# Patient Record
Sex: MALE
[De-identification: names, ages, dates, MRNs, and addresses within clinical notes are randomized per-mention and may not be internally consistent; named-entity substitution may affect disease eponyms.]

---

## 2017-03-15 ENCOUNTER — RESULT CHARGE (OUTPATIENT)
Age: 62
End: 2017-03-15

## 2017-03-15 ENCOUNTER — APPOINTMENT (OUTPATIENT)
Dept: ENDOCRINOLOGY | Facility: CLINIC | Age: 62
End: 2017-03-15

## 2017-03-15 DIAGNOSIS — F41.9 ANXIETY DISORDER, UNSPECIFIED: ICD-10-CM

## 2017-03-15 DIAGNOSIS — Z78.9 OTHER SPECIFIED HEALTH STATUS: ICD-10-CM

## 2017-03-15 LAB
ALBUMIN SERPL ELPH-MCNC: 4.2 G/DL
ALP BLD-CCNC: 91 U/L
ALT SERPL-CCNC: 27 U/L
ANION GAP SERPL CALC-SCNC: 13 MMOL/L
AST SERPL-CCNC: 14 U/L
BILIRUB SERPL-MCNC: 0.4 MG/DL
BUN SERPL-MCNC: 16 MG/DL
CALCIUM SERPL-MCNC: 9.8 MG/DL
CHLORIDE SERPL-SCNC: 96 MMOL/L
CO2 SERPL-SCNC: 24 MMOL/L
CREAT SERPL-MCNC: 0.94 MG/DL
GLUCOSE SERPL-MCNC: 328 MG/DL
HBA1C MFR BLD HPLC: 11.9
POTASSIUM SERPL-SCNC: 4.4 MMOL/L
PROT SERPL-MCNC: 6.9 G/DL
SODIUM SERPL-SCNC: 133 MMOL/L
TSH SERPL-ACNC: 0.94 UIU/ML

## 2017-03-15 RX ORDER — ALPRAZOLAM 2 MG/1
TABLET ORAL
Refills: 0 | Status: ACTIVE | COMMUNITY

## 2017-03-15 RX ORDER — L-METHYLFOLATE-ALGAE-VIT B12-B6 CAP 3-90.314-2-35 MG 3-90.314-2-35 MG
CAP ORAL
Refills: 0 | Status: ACTIVE | COMMUNITY

## 2017-03-21 ENCOUNTER — CLINICAL ADVICE (OUTPATIENT)
Age: 62
End: 2017-03-21

## 2017-03-21 LAB
25(OH)D3 SERPL-MCNC: 16.3 NG/ML
CHOLEST SERPL-MCNC: 198 MG/DL
CHOLEST/HDLC SERPL: 6 RATIO
CREAT SPEC-SCNC: 65 MG/DL
HDLC SERPL-MCNC: 33 MG/DL
LDLC SERPL CALC-MCNC: NORMAL MG/DL
MICROALBUMIN 24H UR DL<=1MG/L-MCNC: 0.5 MG/DL
MICROALBUMIN/CREAT 24H UR-RTO: 8 UG/MG
TRIGL SERPL-MCNC: 604 MG/DL

## 2017-04-05 ENCOUNTER — OTHER (OUTPATIENT)
Age: 62
End: 2017-04-05

## 2017-07-05 ENCOUNTER — APPOINTMENT (OUTPATIENT)
Dept: ENDOCRINOLOGY | Facility: CLINIC | Age: 62
End: 2017-07-05

## 2017-07-05 VITALS
SYSTOLIC BLOOD PRESSURE: 132 MMHG | OXYGEN SATURATION: 98 % | HEART RATE: 88 BPM | BODY MASS INDEX: 33.59 KG/M2 | DIASTOLIC BLOOD PRESSURE: 74 MMHG | HEIGHT: 72 IN | WEIGHT: 248 LBS

## 2017-07-05 DIAGNOSIS — Z78.9 OTHER SPECIFIED HEALTH STATUS: ICD-10-CM

## 2017-07-05 DIAGNOSIS — Z83.3 FAMILY HISTORY OF DIABETES MELLITUS: ICD-10-CM

## 2017-07-05 DIAGNOSIS — Z87.891 PERSONAL HISTORY OF NICOTINE DEPENDENCE: ICD-10-CM

## 2017-07-05 LAB
GLUCOSE BLDC GLUCOMTR-MCNC: 234
HBA1C MFR BLD HPLC: 10.2

## 2017-07-06 ENCOUNTER — RX RENEWAL (OUTPATIENT)
Age: 62
End: 2017-07-06

## 2017-07-09 ENCOUNTER — RX RENEWAL (OUTPATIENT)
Age: 62
End: 2017-07-09

## 2017-07-09 RX ORDER — BLOOD-GLUCOSE METER
KIT MISCELLANEOUS TWICE DAILY
Qty: 1 | Refills: 5 | Status: ACTIVE | COMMUNITY
Start: 2017-07-06 | End: 1900-01-01

## 2017-07-10 LAB
CHOLEST SERPL-MCNC: 215 MG/DL
CHOLEST/HDLC SERPL: 5.4 RATIO
HDLC SERPL-MCNC: 40 MG/DL
LDLC SERPL CALC-MCNC: 103 MG/DL
TRIGL SERPL-MCNC: 359 MG/DL

## 2017-07-10 RX ORDER — SIMVASTATIN 40 MG/1
TABLET, FILM COATED ORAL
Refills: 0 | Status: DISCONTINUED | COMMUNITY
End: 2017-07-10

## 2017-10-09 ENCOUNTER — APPOINTMENT (OUTPATIENT)
Dept: ENDOCRINOLOGY | Facility: CLINIC | Age: 62
End: 2017-10-09
Payer: COMMERCIAL

## 2017-10-09 VITALS
DIASTOLIC BLOOD PRESSURE: 70 MMHG | BODY MASS INDEX: 31.42 KG/M2 | WEIGHT: 232 LBS | HEART RATE: 78 BPM | HEIGHT: 72 IN | OXYGEN SATURATION: 96 % | SYSTOLIC BLOOD PRESSURE: 136 MMHG

## 2017-10-09 LAB
GLUCOSE BLDC GLUCOMTR-MCNC: 100
HBA1C MFR BLD HPLC: 7.5

## 2017-10-09 PROCEDURE — 99214 OFFICE O/P EST MOD 30 MIN: CPT

## 2017-10-11 LAB
25(OH)D3 SERPL-MCNC: 38.2 NG/ML
ALBUMIN SERPL ELPH-MCNC: 4.5 G/DL
ALP BLD-CCNC: 73 U/L
ALT SERPL-CCNC: 23 U/L
ANION GAP SERPL CALC-SCNC: 13 MMOL/L
AST SERPL-CCNC: 22 U/L
BILIRUB SERPL-MCNC: 0.4 MG/DL
BUN SERPL-MCNC: 14 MG/DL
CALCIUM SERPL-MCNC: 9.7 MG/DL
CHLORIDE SERPL-SCNC: 102 MMOL/L
CHOLEST SERPL-MCNC: 116 MG/DL
CHOLEST/HDLC SERPL: 2.6 RATIO
CO2 SERPL-SCNC: 25 MMOL/L
CREAT SERPL-MCNC: 0.91 MG/DL
GLUCOSE SERPL-MCNC: 111 MG/DL
HDLC SERPL-MCNC: 45 MG/DL
LDLC SERPL CALC-MCNC: 47 MG/DL
POTASSIUM SERPL-SCNC: 4.4 MMOL/L
PROT SERPL-MCNC: 6.9 G/DL
SODIUM SERPL-SCNC: 140 MMOL/L
TRIGL SERPL-MCNC: 122 MG/DL

## 2017-11-14 ENCOUNTER — RX RENEWAL (OUTPATIENT)
Age: 62
End: 2017-11-14

## 2017-12-26 ENCOUNTER — RX RENEWAL (OUTPATIENT)
Age: 62
End: 2017-12-26

## 2017-12-26 RX ORDER — DULAGLUTIDE 0.75 MG/.5ML
0.75 INJECTION, SOLUTION SUBCUTANEOUS
Qty: 2 | Refills: 0 | Status: DISCONTINUED | COMMUNITY
Start: 2017-07-10 | End: 2017-12-26

## 2018-01-11 ENCOUNTER — APPOINTMENT (OUTPATIENT)
Dept: ENDOCRINOLOGY | Facility: CLINIC | Age: 63
End: 2018-01-11

## 2018-02-08 ENCOUNTER — APPOINTMENT (OUTPATIENT)
Dept: ENDOCRINOLOGY | Facility: CLINIC | Age: 63
End: 2018-02-08
Payer: COMMERCIAL

## 2018-02-08 VITALS
WEIGHT: 222 LBS | OXYGEN SATURATION: 98 % | HEIGHT: 72 IN | HEART RATE: 63 BPM | BODY MASS INDEX: 30.07 KG/M2 | DIASTOLIC BLOOD PRESSURE: 68 MMHG | SYSTOLIC BLOOD PRESSURE: 122 MMHG

## 2018-02-08 PROCEDURE — 99214 OFFICE O/P EST MOD 30 MIN: CPT

## 2018-02-12 ENCOUNTER — RX RENEWAL (OUTPATIENT)
Age: 63
End: 2018-02-12

## 2018-02-14 LAB
CREAT SPEC-SCNC: 51 MG/DL
MICROALBUMIN 24H UR DL<=1MG/L-MCNC: 0.3 MG/DL
MICROALBUMIN/CREAT 24H UR-RTO: 6 MG/G

## 2018-04-09 ENCOUNTER — RX RENEWAL (OUTPATIENT)
Age: 63
End: 2018-04-09

## 2018-05-11 ENCOUNTER — APPOINTMENT (OUTPATIENT)
Dept: ENDOCRINOLOGY | Facility: CLINIC | Age: 63
End: 2018-05-11
Payer: COMMERCIAL

## 2018-05-11 VITALS
HEART RATE: 78 BPM | RESPIRATION RATE: 16 BRPM | OXYGEN SATURATION: 98 % | DIASTOLIC BLOOD PRESSURE: 68 MMHG | SYSTOLIC BLOOD PRESSURE: 130 MMHG | BODY MASS INDEX: 29.93 KG/M2 | WEIGHT: 221 LBS | HEIGHT: 72 IN

## 2018-05-11 LAB
GLUCOSE BLDC GLUCOMTR-MCNC: 117
HBA1C MFR BLD HPLC: 5.9

## 2018-05-11 PROCEDURE — 82962 GLUCOSE BLOOD TEST: CPT

## 2018-05-11 PROCEDURE — 99214 OFFICE O/P EST MOD 30 MIN: CPT | Mod: 25

## 2018-05-11 PROCEDURE — 83036 HEMOGLOBIN GLYCOSYLATED A1C: CPT | Mod: QW

## 2018-05-11 RX ORDER — GLIMEPIRIDE 4 MG/1
4 TABLET ORAL
Qty: 180 | Refills: 2 | Status: DISCONTINUED | COMMUNITY
Start: 2017-03-15 | End: 2018-05-11

## 2018-05-17 LAB
25(OH)D3 SERPL-MCNC: 56.2 NG/ML
ALBUMIN SERPL ELPH-MCNC: 4.9 G/DL
ALP BLD-CCNC: 62 U/L
ALT SERPL-CCNC: 22 U/L
ANION GAP SERPL CALC-SCNC: 15 MMOL/L
AST SERPL-CCNC: 25 U/L
BASOPHILS # BLD AUTO: 0.02 K/UL
BASOPHILS NFR BLD AUTO: 0.3 %
BILIRUB SERPL-MCNC: 0.4 MG/DL
BUN SERPL-MCNC: 20 MG/DL
CALCIUM SERPL-MCNC: 10.5 MG/DL
CHLORIDE SERPL-SCNC: 100 MMOL/L
CO2 SERPL-SCNC: 26 MMOL/L
CREAT SERPL-MCNC: 0.99 MG/DL
EOSINOPHIL # BLD AUTO: 0.19 K/UL
EOSINOPHIL NFR BLD AUTO: 3.1 %
GLUCOSE SERPL-MCNC: 94 MG/DL
HCT VFR BLD CALC: 38.9 %
HGB BLD-MCNC: 13.2 G/DL
IMM GRANULOCYTES NFR BLD AUTO: 0.2 %
LYMPHOCYTES # BLD AUTO: 1.8 K/UL
LYMPHOCYTES NFR BLD AUTO: 29 %
MAN DIFF?: NORMAL
MCHC RBC-ENTMCNC: 30.1 PG
MCHC RBC-ENTMCNC: 33.9 GM/DL
MCV RBC AUTO: 88.8 FL
MONOCYTES # BLD AUTO: 0.27 K/UL
MONOCYTES NFR BLD AUTO: 4.3 %
NEUTROPHILS # BLD AUTO: 3.92 K/UL
NEUTROPHILS NFR BLD AUTO: 63.1 %
PLATELET # BLD AUTO: 243 K/UL
POTASSIUM SERPL-SCNC: 4.4 MMOL/L
PROT SERPL-MCNC: 7.4 G/DL
RBC # BLD: 4.38 M/UL
RBC # FLD: 13.9 %
SODIUM SERPL-SCNC: 141 MMOL/L
T4 FREE SERPL-MCNC: 1.6 NG/DL
TSH SERPL-ACNC: 0.98 UIU/ML
WBC # FLD AUTO: 6.21 K/UL

## 2018-07-25 ENCOUNTER — RX RENEWAL (OUTPATIENT)
Age: 63
End: 2018-07-25

## 2018-11-06 ENCOUNTER — RX RENEWAL (OUTPATIENT)
Age: 63
End: 2018-11-06

## 2018-11-07 ENCOUNTER — MEDICATION RENEWAL (OUTPATIENT)
Age: 63
End: 2018-11-07

## 2018-12-13 ENCOUNTER — APPOINTMENT (OUTPATIENT)
Dept: ENDOCRINOLOGY | Facility: CLINIC | Age: 63
End: 2018-12-13
Payer: COMMERCIAL

## 2018-12-13 VITALS
HEIGHT: 72 IN | HEART RATE: 75 BPM | OXYGEN SATURATION: 97 % | BODY MASS INDEX: 29.53 KG/M2 | SYSTOLIC BLOOD PRESSURE: 126 MMHG | DIASTOLIC BLOOD PRESSURE: 80 MMHG | WEIGHT: 218 LBS

## 2018-12-13 LAB
GLUCOSE BLDC GLUCOMTR-MCNC: 116
HBA1C MFR BLD HPLC: 6.8

## 2018-12-13 PROCEDURE — 99214 OFFICE O/P EST MOD 30 MIN: CPT | Mod: 25

## 2018-12-13 PROCEDURE — 83036 HEMOGLOBIN GLYCOSYLATED A1C: CPT | Mod: QW

## 2018-12-13 PROCEDURE — 82962 GLUCOSE BLOOD TEST: CPT

## 2018-12-20 LAB
25(OH)D3 SERPL-MCNC: 50.8 NG/ML
ALBUMIN SERPL ELPH-MCNC: 4.7 G/DL
ALP BLD-CCNC: 62 U/L
ALT SERPL-CCNC: 29 U/L
ANION GAP SERPL CALC-SCNC: 13 MMOL/L
AST SERPL-CCNC: 29 U/L
BASOPHILS # BLD AUTO: 0.04 K/UL
BASOPHILS NFR BLD AUTO: 0.6 %
BILIRUB SERPL-MCNC: 0.5 MG/DL
BUN SERPL-MCNC: 22 MG/DL
CALCIUM SERPL-MCNC: 9.9 MG/DL
CHLORIDE SERPL-SCNC: 100 MMOL/L
CO2 SERPL-SCNC: 23 MMOL/L
CREAT SERPL-MCNC: 1.01 MG/DL
CREAT SPEC-SCNC: 32 MG/DL
EOSINOPHIL # BLD AUTO: 0.17 K/UL
EOSINOPHIL NFR BLD AUTO: 2.4 %
GLUCOSE SERPL-MCNC: 110 MG/DL
HCT VFR BLD CALC: 39.7 %
HGB BLD-MCNC: 12.9 G/DL
IMM GRANULOCYTES NFR BLD AUTO: 0.3 %
LYMPHOCYTES # BLD AUTO: 2.25 K/UL
LYMPHOCYTES NFR BLD AUTO: 31.4 %
MAN DIFF?: NORMAL
MCHC RBC-ENTMCNC: 29.8 PG
MCHC RBC-ENTMCNC: 32.5 GM/DL
MCV RBC AUTO: 91.7 FL
MICROALBUMIN 24H UR DL<=1MG/L-MCNC: <1.2 MG/DL
MICROALBUMIN/CREAT 24H UR-RTO: NORMAL
MONOCYTES # BLD AUTO: 0.42 K/UL
MONOCYTES NFR BLD AUTO: 5.9 %
NEUTROPHILS # BLD AUTO: 4.27 K/UL
NEUTROPHILS NFR BLD AUTO: 59.4 %
PLATELET # BLD AUTO: 230 K/UL
POTASSIUM SERPL-SCNC: 4.1 MMOL/L
PROT SERPL-MCNC: 7.2 G/DL
PSA FREE FLD-MCNC: 40 %
PSA FREE SERPL-MCNC: 0.43 NG/ML
PSA SERPL-MCNC: 1.08 NG/ML
RBC # BLD: 4.33 M/UL
RBC # FLD: 13.1 %
SODIUM SERPL-SCNC: 136 MMOL/L
T4 FREE SERPL-MCNC: 1.6 NG/DL
TSH SERPL-ACNC: 1.06 UIU/ML
WBC # FLD AUTO: 7.17 K/UL

## 2018-12-26 ENCOUNTER — RX RENEWAL (OUTPATIENT)
Age: 63
End: 2018-12-26

## 2019-03-25 ENCOUNTER — RX RENEWAL (OUTPATIENT)
Age: 64
End: 2019-03-25

## 2019-03-27 ENCOUNTER — RX RENEWAL (OUTPATIENT)
Age: 64
End: 2019-03-27

## 2019-04-05 ENCOUNTER — APPOINTMENT (OUTPATIENT)
Dept: ENDOCRINOLOGY | Facility: CLINIC | Age: 64
End: 2019-04-05
Payer: COMMERCIAL

## 2019-04-05 VITALS
DIASTOLIC BLOOD PRESSURE: 80 MMHG | WEIGHT: 225 LBS | BODY MASS INDEX: 30.48 KG/M2 | HEIGHT: 72 IN | SYSTOLIC BLOOD PRESSURE: 120 MMHG

## 2019-04-05 PROCEDURE — 99214 OFFICE O/P EST MOD 30 MIN: CPT

## 2019-04-07 LAB
CHOLEST SERPL-MCNC: 107 MG/DL
CHOLEST/HDLC SERPL: 2.5 RATIO
HBA1C MFR BLD HPLC: 6.8
HDLC SERPL-MCNC: 43 MG/DL
LDLC SERPL CALC-MCNC: 45 MG/DL
TRIGL SERPL-MCNC: 93 MG/DL

## 2019-07-02 ENCOUNTER — RX RENEWAL (OUTPATIENT)
Age: 64
End: 2019-07-02

## 2019-07-29 ENCOUNTER — RX RENEWAL (OUTPATIENT)
Age: 64
End: 2019-07-29

## 2019-08-09 ENCOUNTER — APPOINTMENT (OUTPATIENT)
Dept: ENDOCRINOLOGY | Facility: CLINIC | Age: 64
End: 2019-08-09
Payer: COMMERCIAL

## 2019-08-09 VITALS
DIASTOLIC BLOOD PRESSURE: 70 MMHG | SYSTOLIC BLOOD PRESSURE: 134 MMHG | WEIGHT: 234 LBS | HEIGHT: 72 IN | OXYGEN SATURATION: 98 % | HEART RATE: 79 BPM | BODY MASS INDEX: 31.69 KG/M2

## 2019-08-09 LAB — HBA1C MFR BLD HPLC: 7

## 2019-08-09 PROCEDURE — 99214 OFFICE O/P EST MOD 30 MIN: CPT | Mod: 25

## 2019-08-09 PROCEDURE — 83036 HEMOGLOBIN GLYCOSYLATED A1C: CPT | Mod: QW

## 2019-08-15 LAB
25(OH)D3 SERPL-MCNC: 50.2 NG/ML
ALBUMIN SERPL ELPH-MCNC: 4.8 G/DL
ALP BLD-CCNC: 71 U/L
ALT SERPL-CCNC: 28 U/L
ANION GAP SERPL CALC-SCNC: 14 MMOL/L
AST SERPL-CCNC: 25 U/L
BASOPHILS # BLD AUTO: 0.06 K/UL
BASOPHILS NFR BLD AUTO: 0.9 %
BILIRUB SERPL-MCNC: 0.7 MG/DL
BUN SERPL-MCNC: 14 MG/DL
CALCIUM SERPL-MCNC: 10.2 MG/DL
CHLORIDE SERPL-SCNC: 99 MMOL/L
CO2 SERPL-SCNC: 27 MMOL/L
CREAT SERPL-MCNC: 1.01 MG/DL
EOSINOPHIL # BLD AUTO: 0.16 K/UL
EOSINOPHIL NFR BLD AUTO: 2.5 %
GLUCOSE SERPL-MCNC: 135 MG/DL
HCT VFR BLD CALC: 38.1 %
HGB BLD-MCNC: 12.6 G/DL
IMM GRANULOCYTES NFR BLD AUTO: 0.3 %
LYMPHOCYTES # BLD AUTO: 1.84 K/UL
LYMPHOCYTES NFR BLD AUTO: 28.8 %
MAN DIFF?: NORMAL
MCHC RBC-ENTMCNC: 30 PG
MCHC RBC-ENTMCNC: 33.1 GM/DL
MCV RBC AUTO: 90.7 FL
MONOCYTES # BLD AUTO: 0.46 K/UL
MONOCYTES NFR BLD AUTO: 7.2 %
NEUTROPHILS # BLD AUTO: 3.85 K/UL
NEUTROPHILS NFR BLD AUTO: 60.3 %
PLATELET # BLD AUTO: 200 K/UL
POTASSIUM SERPL-SCNC: 4.3 MMOL/L
PROT SERPL-MCNC: 7.2 G/DL
RBC # BLD: 4.2 M/UL
RBC # FLD: 13 %
SODIUM SERPL-SCNC: 140 MMOL/L
T4 FREE SERPL-MCNC: 1.7 NG/DL
TSH SERPL-ACNC: 0.71 UIU/ML
WBC # FLD AUTO: 6.39 K/UL

## 2019-10-18 ENCOUNTER — APPOINTMENT (OUTPATIENT)
Dept: ENDOCRINOLOGY | Facility: CLINIC | Age: 64
End: 2019-10-18
Payer: COMMERCIAL

## 2019-10-18 VITALS
SYSTOLIC BLOOD PRESSURE: 142 MMHG | DIASTOLIC BLOOD PRESSURE: 72 MMHG | HEIGHT: 72 IN | WEIGHT: 232 LBS | BODY MASS INDEX: 31.42 KG/M2 | OXYGEN SATURATION: 98 % | HEART RATE: 75 BPM

## 2019-10-18 PROCEDURE — 99214 OFFICE O/P EST MOD 30 MIN: CPT

## 2019-10-18 RX ORDER — PIOGLITAZONE HYDROCHLORIDE 30 MG/1
30 TABLET ORAL
Qty: 30 | Refills: 5 | Status: DISCONTINUED | COMMUNITY
Start: 2017-03-21 | End: 2019-10-18

## 2019-10-19 NOTE — REVIEW OF SYSTEMS
[Recent Weight Loss (___ Lbs)] : recent [unfilled] ~Ulb weight loss [Pain/Numbness of Digits] : pain/numbness of digits [Stress] : stress [All other systems negative] : All other systems negative

## 2019-10-19 NOTE — HISTORY OF PRESENT ILLNESS
[FreeTextEntry1] : This is a 64 y.o. man w/ DM2 and HLD here for f/u visit for DM2.\par \par He met w/ CDE 3/2017. He is currently on Metformin 1000mg PO BID , Jardiance 10mg PO daily, and Trulicity 1.5mg sq qweek. \par Jardiance was started last visit instead of Actos.\par \par Hba1c 7 last visit. \par \par He is seeing a local nutritionist. \par \par He has been checking FS's occasionally:\par AM: low 100's\par \par No c/o polyuria/polydipsia. No dry mouth or blurry vision. No CP or SOB. He has seasonal allergies.\par \par He meets w/ a dietician every 2 weeks. He states he generally eats healthy. He has salad daily w/ a low jamia dressing and one slice of bread. Dinner is meat and vegetables and a yam.  [facility] is a garden of [employer]. Lunch is a tuna sandwich. He drinks diet Snapple and water. He stays away from sweets. His diet has not been good over the recent holidays.\par \par He goes to the gym 6x/ week.  He lost 2 lbs since last visit.\par \par He has been feeling overall ok w/o complaints. He has been under a lot of stress. His mother has been sick.\par \par He last saw optho 9/2019. No retinopathy. He has sx of neuropathy, slightly improved. He takes Metanx and follows up w/ podiatry. \par He has been having a lot of cramps and a heaviness in in his lower legs. \par

## 2019-10-19 NOTE — ASSESSMENT
[FreeTextEntry1] : This is a 64 y.o. man w/ DM2 and HLD here for f/u visit for DM2.\par \par DM2: He is currently on Metformin 1000mg PO BID,  Trulicity 1.5mg qweek, and Jardiance 10mg. Hba1c 7 last visit, up from 6.8 last visit. Will cont. current regimen for now.  Worsening control likely 2/2 combination of increased stress, and decreased exercise  Instructed him to  check FS's daily, alternating b/w qam and 1-2h after dinner. He has met w/ dietician and is watching his diet and is now back to exercising. Cont. lifestyle modification and weight loss. Will check Hba1c today and consider increasing Jardiance based on results.\par BP ok, not on meds.\par UTD w/ optho (9/2019) and podiatry.\par Microalb WNL 12/2018. Recheck today.\par \par HLD: Cont. lifestyle modification and weight loss. Cont. lipitor. LDL 45 4/2019.\par \par RTC 3 mths\par \par

## 2019-10-19 NOTE — PHYSICAL EXAM
[Alert] : alert [No Acute Distress] : no acute distress [Well Nourished] : well nourished [No Proptosis] : no proptosis [Normal Hearing] : hearing was normal [Normal Lips/Gums] : the lips and gums were normal [Normal Oropharynx] : the oropharynx was normal [Supple] : the neck was supple [No LAD] : no lymphadenopathy [No Respiratory Distress] : no respiratory distress [Thyroid Not Enlarged] : the thyroid was not enlarged [Normal Rate and Effort] : normal respiratory rhythm and effort [No Accessory Muscle Use] : no accessory muscle use [Clear to Auscultation] : lungs were clear to auscultation bilaterally [Normal Rate] : heart rate was normal  [Normal S1, S2] : normal S1 and S2 [Regular Rhythm] : with a regular rhythm [No Edema] : there was no peripheral edema [Normal Bowel Sounds] : normal bowel sounds [Not Tender] : non-tender [Soft] : abdomen soft [Not Distended] : not distended [No CVA Tenderness] : no ~M costovertebral angle tenderness [No Stigmata of Cushings Syndrome] : no stigmata of cushings syndrome [Normal Gait] : normal gait [No Involuntary Movements] : no involuntary movements were seen [Normal] : normal [Full ROM] : with full range of motion [Diminished Throughout Both Feet] : normal tactile sensation with monofilament testing throughout both feet [No Tremors] : no tremors [Oriented x3] : oriented to person, place, and time [Normal Affect] : the affect was normal

## 2019-10-23 LAB
CREAT SPEC-SCNC: 44 MG/DL
MICROALBUMIN 24H UR DL<=1MG/L-MCNC: <1.2 MG/DL
MICROALBUMIN/CREAT 24H UR-RTO: NORMAL MG/G

## 2019-10-28 ENCOUNTER — RX RENEWAL (OUTPATIENT)
Age: 64
End: 2019-10-28

## 2019-11-22 ENCOUNTER — RX RENEWAL (OUTPATIENT)
Age: 64
End: 2019-11-22

## 2019-11-26 ENCOUNTER — MEDICATION RENEWAL (OUTPATIENT)
Age: 64
End: 2019-11-26

## 2019-12-02 ENCOUNTER — RX RENEWAL (OUTPATIENT)
Age: 64
End: 2019-12-02

## 2020-02-20 ENCOUNTER — APPOINTMENT (OUTPATIENT)
Dept: NEUROLOGY | Facility: CLINIC | Age: 65
End: 2020-02-20
Payer: COMMERCIAL

## 2020-02-20 VITALS
BODY MASS INDEX: 31.15 KG/M2 | SYSTOLIC BLOOD PRESSURE: 136 MMHG | WEIGHT: 230 LBS | HEIGHT: 72 IN | DIASTOLIC BLOOD PRESSURE: 77 MMHG | HEART RATE: 103 BPM

## 2020-02-20 DIAGNOSIS — M47.22 OTHER SPONDYLOSIS WITH MYELOPATHY, CERVICAL REGION: ICD-10-CM

## 2020-02-20 DIAGNOSIS — M47.12 OTHER SPONDYLOSIS WITH MYELOPATHY, CERVICAL REGION: ICD-10-CM

## 2020-02-20 PROCEDURE — 99204 OFFICE O/P NEW MOD 45 MIN: CPT

## 2020-02-20 RX ORDER — LINACLOTIDE 145 UG/1
145 CAPSULE, GELATIN COATED ORAL
Refills: 0 | Status: ACTIVE | COMMUNITY

## 2020-02-20 RX ORDER — TAMSULOSIN HYDROCHLORIDE 0.4 MG/1
0.4 CAPSULE ORAL
Refills: 0 | Status: ACTIVE | COMMUNITY

## 2020-02-20 RX ORDER — MONTELUKAST SODIUM 10 MG/1
TABLET, FILM COATED ORAL
Refills: 0 | Status: ACTIVE | COMMUNITY

## 2020-02-20 RX ORDER — LIDOCAINE 5% 700 MG/1
5 PATCH TOPICAL
Qty: 60 | Refills: 5 | Status: ACTIVE | COMMUNITY
Start: 2020-02-20 | End: 1900-01-01

## 2020-02-20 NOTE — ASSESSMENT
[FreeTextEntry1] : Patient has features of neuropathy by history but exam findings worrisome for myelopathy.  Must r/o cervical cord compromise and medical causes.\par \par Will get MRI C spine, check vit B12 and MMA and copper. \par \par Will treat neuropathic pain at night with lidoderm patches.  \par Will call him with results.

## 2020-02-20 NOTE — PHYSICAL EXAM
[General Appearance - Alert] : alert [General Appearance - In No Acute Distress] : in no acute distress [Person] : oriented to person [Place] : oriented to place [Time] : oriented to time [Short Term Intact] : short term memory intact [Remote Intact] : remote memory intact [Registration Intact] : recent registration memory intact [Concentration Intact] : normal concentrating ability [Visual Intact] : visual attention was ~T not ~L decreased [Naming Objects] : no difficulty naming common objects [Repeating Phrases] : no difficulty repeating a phrase [Writing A Sentence] : no difficulty writing a sentence [Fluency] : fluency intact [Comprehension] : comprehension intact [Reading] : reading intact [Cranial Nerves Optic (II)] : visual acuity intact bilaterally,  visual fields full to confrontation, pupils equal round and reactive to light [Past History] : adequate knowledge of personal past history [Cranial Nerves Oculomotor (III)] : extraocular motion intact [Cranial Nerves Trigeminal (V)] : facial sensation intact symmetrically [Cranial Nerves Facial (VII)] : face symmetrical [Cranial Nerves Vestibulocochlear (VIII)] : hearing was intact bilaterally [Cranial Nerves Glossopharyngeal (IX)] : tongue and palate midline [Cranial Nerves Accessory (XI - Cranial And Spinal)] : head turning and shoulder shrug symmetric [Cranial Nerves Hypoglossal (XII)] : there was no tongue deviation with protrusion [Motor Tone] : muscle tone was normal in all four extremities [No Muscle Atrophy] : normal bulk in all four extremities [Motor Handedness Right-Handed] : the patient is right hand dominant [Hand Weakness Right] : normal hand  [Hand Weakness Left] : normal hand  [+4] : hip flexion +4/5 [5] : ankle plantar flexion 5/5 [Sensation Tactile Decrease] : light touch was intact [Romberg's Sign] : a positive Romberg's sign was present [Vibration Decrease Leg / Foot Both Ankles] : decreased at both ankles [Vibration Decrease Leg / Foot Toes Both Feet] : decreased at the toes of both feet  [Position Sensation Decrease Leg/Foot At Level Of Toes] : impaired at the toes in both legs [Past-pointing] : there was no past-pointing [Tremor] : no tremor present [1+] : Triceps left 1+ [2+] : Ankle jerk left 2+ [Plantar Reflex Right Only] : abnormal on the right [Plantar Reflex Left Only] : normal on the left [FreeTextEntry8] : slightly wide based gait, cannot tandem [FreeTextEntry9] : brisk FF bilateral [Neck Appearance] : the appearance of the neck was normal [Neck Cervical Mass (___cm)] : no neck mass was observed [Jugular Venous Distention Increased] : there was no jugular-venous distention [Thyroid Diffuse Enlargement] : the thyroid was not enlarged [Thyroid Nodule] : there were no palpable thyroid nodules [] : no respiratory distress [Auscultation Breath Sounds / Voice Sounds] : lungs were clear to auscultation bilaterally [Heart Rate And Rhythm] : heart rate was normal and rhythm regular [Heart Sounds] : normal S1 and S2 [Heart Sounds Gallop] : no gallops [Murmurs] : no murmurs [Heart Sounds Pericardial Friction Rub] : no pericardial rub

## 2020-02-20 NOTE — HISTORY OF PRESENT ILLNESS
[FreeTextEntry1] : Patient started having burning soles of feet about a year ago.  He also gets cramps in calves and toes sporadically.  Burning pain can interrupt sleep.  Doesn't notice it much during the day.  He feels his legs are heavy and stiff when he starts walking.  He denies symptoms in hands.  He has urinary frequency for a few years which has worsened over the past year with the burning and he has now nocturia.  Has not taken anything for the burning.  He has been taking Metanx for many years for numbness in the feet that has gone away.  \par \par No imbalance when walking.  He denies neck pain.  He has had diabetes for 20 years.  He denies significant FH.

## 2020-02-21 ENCOUNTER — RX RENEWAL (OUTPATIENT)
Age: 65
End: 2020-02-21

## 2020-02-25 ENCOUNTER — APPOINTMENT (OUTPATIENT)
Dept: ENDOCRINOLOGY | Facility: CLINIC | Age: 65
End: 2020-02-25
Payer: COMMERCIAL

## 2020-02-25 VITALS
DIASTOLIC BLOOD PRESSURE: 70 MMHG | BODY MASS INDEX: 30.48 KG/M2 | HEART RATE: 85 BPM | HEIGHT: 72 IN | WEIGHT: 225 LBS | SYSTOLIC BLOOD PRESSURE: 130 MMHG | OXYGEN SATURATION: 96 %

## 2020-02-25 LAB — HBA1C MFR BLD HPLC: 7.8

## 2020-02-25 PROCEDURE — 99214 OFFICE O/P EST MOD 30 MIN: CPT

## 2020-02-25 PROCEDURE — 83036 HEMOGLOBIN GLYCOSYLATED A1C: CPT | Mod: QW

## 2020-02-27 LAB
25(OH)D3 SERPL-MCNC: 63.9 NG/ML
ALBUMIN SERPL ELPH-MCNC: 4.9 G/DL
ALP BLD-CCNC: 76 U/L
ALT SERPL-CCNC: 31 U/L
ANION GAP SERPL CALC-SCNC: 17 MMOL/L
AST SERPL-CCNC: 24 U/L
BASOPHILS # BLD AUTO: 0.06 K/UL
BASOPHILS NFR BLD AUTO: 0.8 %
BILIRUB SERPL-MCNC: 0.3 MG/DL
BUN SERPL-MCNC: 16 MG/DL
CALCIUM SERPL-MCNC: 10.2 MG/DL
CHLORIDE SERPL-SCNC: 101 MMOL/L
CHOLEST SERPL-MCNC: 122 MG/DL
CHOLEST/HDLC SERPL: 2.7 RATIO
CO2 SERPL-SCNC: 25 MMOL/L
CREAT SERPL-MCNC: 1.05 MG/DL
EOSINOPHIL # BLD AUTO: 0.11 K/UL
EOSINOPHIL NFR BLD AUTO: 1.5 %
GLUCOSE SERPL-MCNC: 138 MG/DL
HCT VFR BLD CALC: 43.2 %
HDLC SERPL-MCNC: 46 MG/DL
HGB BLD-MCNC: 13.8 G/DL
IMM GRANULOCYTES NFR BLD AUTO: 0.3 %
LDLC SERPL CALC-MCNC: 43 MG/DL
LYMPHOCYTES # BLD AUTO: 1.86 K/UL
LYMPHOCYTES NFR BLD AUTO: 25.9 %
MAN DIFF?: NORMAL
MCHC RBC-ENTMCNC: 29.7 PG
MCHC RBC-ENTMCNC: 31.9 GM/DL
MCV RBC AUTO: 93.1 FL
MONOCYTES # BLD AUTO: 0.52 K/UL
MONOCYTES NFR BLD AUTO: 7.3 %
NEUTROPHILS # BLD AUTO: 4.6 K/UL
NEUTROPHILS NFR BLD AUTO: 64.2 %
PLATELET # BLD AUTO: 238 K/UL
POTASSIUM SERPL-SCNC: 4.5 MMOL/L
PROT SERPL-MCNC: 6.8 G/DL
PSA FREE FLD-MCNC: 39 %
PSA FREE SERPL-MCNC: 0.4 NG/ML
PSA SERPL-MCNC: 1.01 NG/ML
RBC # BLD: 4.64 M/UL
RBC # FLD: 14.3 %
SODIUM SERPL-SCNC: 142 MMOL/L
T4 FREE SERPL-MCNC: 1.5 NG/DL
TRIGL SERPL-MCNC: 169 MG/DL
TSH SERPL-ACNC: 0.91 UIU/ML
WBC # FLD AUTO: 7.17 K/UL

## 2020-04-28 ENCOUNTER — RX RENEWAL (OUTPATIENT)
Age: 65
End: 2020-04-28

## 2020-06-02 ENCOUNTER — APPOINTMENT (OUTPATIENT)
Dept: ENDOCRINOLOGY | Facility: CLINIC | Age: 65
End: 2020-06-02
Payer: MEDICARE

## 2020-06-02 VITALS
SYSTOLIC BLOOD PRESSURE: 130 MMHG | HEIGHT: 72 IN | HEART RATE: 78 BPM | BODY MASS INDEX: 30.2 KG/M2 | DIASTOLIC BLOOD PRESSURE: 80 MMHG | TEMPERATURE: 97.7 F | WEIGHT: 223 LBS | OXYGEN SATURATION: 98 %

## 2020-06-02 PROCEDURE — 99214 OFFICE O/P EST MOD 30 MIN: CPT

## 2020-06-02 RX ORDER — ISOPROPYL ALCOHOL 0.7 ML/ML
SWAB TOPICAL
Qty: 2 | Refills: 5 | Status: ACTIVE | COMMUNITY
Start: 2020-06-02 | End: 1900-01-01

## 2020-06-08 LAB
CREAT SPEC-SCNC: 83 MG/DL
MICROALBUMIN 24H UR DL<=1MG/L-MCNC: 1.7 MG/DL
MICROALBUMIN/CREAT 24H UR-RTO: 20 MG/G

## 2020-08-10 ENCOUNTER — RX RENEWAL (OUTPATIENT)
Age: 65
End: 2020-08-10

## 2020-09-07 ENCOUNTER — RX RENEWAL (OUTPATIENT)
Age: 65
End: 2020-09-07

## 2020-09-29 ENCOUNTER — APPOINTMENT (OUTPATIENT)
Dept: ENDOCRINOLOGY | Facility: CLINIC | Age: 65
End: 2020-09-29

## 2020-11-19 ENCOUNTER — RX RENEWAL (OUTPATIENT)
Age: 65
End: 2020-11-19

## 2020-12-04 ENCOUNTER — APPOINTMENT (OUTPATIENT)
Dept: ENDOCRINOLOGY | Facility: CLINIC | Age: 65
End: 2020-12-04
Payer: MEDICARE

## 2020-12-04 VITALS
WEIGHT: 221 LBS | BODY MASS INDEX: 29.93 KG/M2 | HEART RATE: 90 BPM | DIASTOLIC BLOOD PRESSURE: 62 MMHG | TEMPERATURE: 98.1 F | HEIGHT: 72 IN | SYSTOLIC BLOOD PRESSURE: 150 MMHG | OXYGEN SATURATION: 98 %

## 2020-12-04 PROCEDURE — 83036 HEMOGLOBIN GLYCOSYLATED A1C: CPT | Mod: QW

## 2020-12-04 PROCEDURE — 99214 OFFICE O/P EST MOD 30 MIN: CPT

## 2020-12-05 RX ORDER — PIOGLITAZONE HYDROCHLORIDE 30 MG/1
30 TABLET ORAL DAILY
Qty: 90 | Refills: 3 | Status: DISCONTINUED | COMMUNITY
Start: 2017-03-21 | End: 2020-12-05

## 2020-12-11 LAB
25(OH)D3 SERPL-MCNC: 74.3 NG/ML
ALBUMIN SERPL ELPH-MCNC: 4.9 G/DL
ALP BLD-CCNC: 86 U/L
ALT SERPL-CCNC: 38 U/L
ANION GAP SERPL CALC-SCNC: 11 MMOL/L
AST SERPL-CCNC: 16 U/L
BASOPHILS # BLD AUTO: 0.05 K/UL
BASOPHILS NFR BLD AUTO: 0.6 %
BILIRUB SERPL-MCNC: 0.5 MG/DL
BUN SERPL-MCNC: 18 MG/DL
CALCIUM SERPL-MCNC: 10 MG/DL
CHLORIDE SERPL-SCNC: 97 MMOL/L
CHOLEST SERPL-MCNC: 148 MG/DL
CO2 SERPL-SCNC: 23 MMOL/L
CREAT SERPL-MCNC: 0.9 MG/DL
CREAT SPEC-SCNC: 89 MG/DL
EOSINOPHIL # BLD AUTO: 0.16 K/UL
EOSINOPHIL NFR BLD AUTO: 2 %
GLUCOSE SERPL-MCNC: 198 MG/DL
HBA1C MFR BLD HPLC: 9.1
HCT VFR BLD CALC: 46.9 %
HDLC SERPL-MCNC: 43 MG/DL
HGB BLD-MCNC: 15.3 G/DL
IMM GRANULOCYTES NFR BLD AUTO: 0.4 %
LDLC SERPL CALC-MCNC: 48 MG/DL
LYMPHOCYTES # BLD AUTO: 1.69 K/UL
LYMPHOCYTES NFR BLD AUTO: 20.6 %
MAN DIFF?: NORMAL
MCHC RBC-ENTMCNC: 29.1 PG
MCHC RBC-ENTMCNC: 32.6 GM/DL
MCV RBC AUTO: 89.2 FL
MICROALBUMIN 24H UR DL<=1MG/L-MCNC: 2.6 MG/DL
MICROALBUMIN/CREAT 24H UR-RTO: 29 MG/G
MONOCYTES # BLD AUTO: 0.5 K/UL
MONOCYTES NFR BLD AUTO: 6.1 %
NEUTROPHILS # BLD AUTO: 5.76 K/UL
NEUTROPHILS NFR BLD AUTO: 70.3 %
NONHDLC SERPL-MCNC: 105 MG/DL
PLATELET # BLD AUTO: 246 K/UL
POTASSIUM SERPL-SCNC: 4.7 MMOL/L
PROT SERPL-MCNC: 7.3 G/DL
RBC # BLD: 5.26 M/UL
RBC # FLD: 12.9 %
SARS-COV-2 IGG SERPL IA-ACNC: 9.47 INDEX
SARS-COV-2 IGG SERPL QL IA: POSITIVE
SODIUM SERPL-SCNC: 132 MMOL/L
TRIGL SERPL-MCNC: 283 MG/DL
TSH SERPL-ACNC: 0.81 UIU/ML
WBC # FLD AUTO: 8.19 K/UL

## 2021-03-01 ENCOUNTER — APPOINTMENT (OUTPATIENT)
Dept: ENDOCRINOLOGY | Facility: CLINIC | Age: 66
End: 2021-03-01

## 2021-04-07 ENCOUNTER — RX RENEWAL (OUTPATIENT)
Age: 66
End: 2021-04-07

## 2021-04-12 ENCOUNTER — APPOINTMENT (OUTPATIENT)
Dept: ENDOCRINOLOGY | Facility: CLINIC | Age: 66
End: 2021-04-12
Payer: MEDICARE

## 2021-04-12 VITALS
DIASTOLIC BLOOD PRESSURE: 80 MMHG | WEIGHT: 231 LBS | HEART RATE: 73 BPM | SYSTOLIC BLOOD PRESSURE: 140 MMHG | TEMPERATURE: 98.5 F | BODY MASS INDEX: 31.33 KG/M2 | OXYGEN SATURATION: 93 %

## 2021-04-12 LAB
GLUCOSE BLDC GLUCOMTR-MCNC: 173
HBA1C MFR BLD HPLC: 8.7

## 2021-04-12 PROCEDURE — 82962 GLUCOSE BLOOD TEST: CPT

## 2021-04-12 PROCEDURE — 83036 HEMOGLOBIN GLYCOSYLATED A1C: CPT | Mod: QW

## 2021-04-12 PROCEDURE — 99214 OFFICE O/P EST MOD 30 MIN: CPT

## 2021-04-12 NOTE — ASSESSMENT
[Diabetes Foot Care] : diabetes foot care [Long Term Vascular Complications] : long term vascular complications of diabetes [Carbohydrate Consistent Diet] : carbohydrate consistent diet [Importance of Diet and Exercise] : importance of diet and exercise to improve glycemic control, achieve weight loss and improve cardiovascular health [Exercise/Effect on Glucose] : exercise/effect on glucose [Hypoglycemia Management] : hypoglycemia management [Glucagon Use] : glucagon use [Ketone Testing] : ketone testing [Action and use of Insulin] : action and use of short and long-acting insulin [Self Monitoring of Blood Glucose] : self monitoring of blood glucose [Insulin Self-Administration] : insulin self-administration [Injection Technique, Storage, Sharps Disposal] : injection technique, storage, and sharps disposal [Sick-Day Management] : sick-day management [Retinopathy Screening] : Patient was referred to ophthalmology for retinopathy screening [FreeTextEntry1] : Patient is a 66-year-old man with history of type 2 diabetes, hyperlipidemia, hypertension, vitamin D deficiency, here for endocrinology follow-up.\par \par 1.  Type 2 diabetes\par Patient reports a normal stress from his work, works at an , and his mother is now ill with a lot of health issues including compression fractures, excessive weight gain.\par December 4, 2020, A1c 9.1%, A1c improved a little bit to 8.7% today, April 12, 2021.\par Microalbumin check in December 4, 2020, 29 mg/g\par Recommend to continue with Metformin 1000 twice daily\par Trulicity 3.0 mg once weekly\par Jardiance 10 mg once daily\par Ophthalmology visit needs to schedule appt. \par Podiatry visit up to date, follows up with Dr. Levin\par Needs to go back to the gym, and A1C has been worsening.  \par \par 2.  Hyperlipidemia\par LDL level is 48 December 4, 2020\par Triglyceride elevated at 283 mg/dL\par Continue with statin therapy Lipitor 40 mg\par \par 3.  Vitamin D deficiency\par Vitamin D level in December 2020 was 73.74.3\par Continue to monitor

## 2021-04-12 NOTE — HISTORY OF PRESENT ILLNESS
[FreeTextEntry1] : 65-year-old man with history of type 2 diabetes, hyperlipidemia, here to follow-up for type 2 diabetes\par \par Patient diagnosed with type 2 diabetes 30 years ago.  \par No history of diabetic retinopathy neuropathy or nephropathy\par \par Current regimen includes Metformin 1000 mg twice daily, Jardiance 10 mg once daily and Trulicity 3.0 mg once weekly. \par \par Patient checks fingersticks, he checks glucose once daily.  He usually checks in the morning.  Lately it has been a little "high".  Generally in 120-130s.  Recently had high Carb intake due to Passover holiday and had raised his sugar.  \par \par No history of polydipsia polyuria, no dry mouth, no blurry vision.  Currently no chest pain or shortness of breath.\par \par Generally eat a healthy diet. \par Breakfast: Oatmeals or half a bagel with a slice of cheese\par Lunch: Turkey sandwich\par Dinner: Some form of meat or chicken with vegetables, salad.  Usually a small piece of Carb (one slice of bread) with the salad.  \par \par Ophthalmologist: Fazal Medel;  he will need a follow up appointment.  \par Podiatrist: He follows with Dr. Levin, up to date.  \par \par Regarding hyperlipidemia, currently on atorvastatin 40 mg once daily.  No side effects.\par

## 2021-06-21 ENCOUNTER — RX RENEWAL (OUTPATIENT)
Age: 66
End: 2021-06-21

## 2021-06-22 ENCOUNTER — RX RENEWAL (OUTPATIENT)
Age: 66
End: 2021-06-22

## 2021-07-13 ENCOUNTER — APPOINTMENT (OUTPATIENT)
Dept: ENDOCRINOLOGY | Facility: CLINIC | Age: 66
End: 2021-07-13
Payer: MEDICARE

## 2021-07-13 VITALS
SYSTOLIC BLOOD PRESSURE: 126 MMHG | BODY MASS INDEX: 32.01 KG/M2 | HEART RATE: 83 BPM | DIASTOLIC BLOOD PRESSURE: 70 MMHG | WEIGHT: 236 LBS | OXYGEN SATURATION: 98 % | TEMPERATURE: 98.4 F

## 2021-07-13 PROCEDURE — 99214 OFFICE O/P EST MOD 30 MIN: CPT

## 2021-07-13 NOTE — DATA REVIEWED
[FreeTextEntry1] : 6/30/2021\par GLUCOSE 179\par LDL 43\par CHOLESTEROL 139\par A1C 8.8%\par TSH 1.18\par HEMOGLBOIN 15.4\par HEMATOCRIT 46.4\par VITAMIN D 28\par VITAMIN B-12 >2000 PG/ML\par \par

## 2021-07-13 NOTE — HISTORY OF PRESENT ILLNESS
[FreeTextEntry1] : 66 - year-old man with history of type 2 diabetes, hyperlipidemia, here to follow-up for type 2 diabetes\par \par Patient diagnosed with type 2 diabetes 30 years ago.  \par No history of diabetic retinopathy neuropathy or nephropathy\par \par Current regimen includes Metformin 1000 mg twice daily, Jardiance 10 mg once daily and Trulicity 3.0 mg once weekly. \par \par Patient checks fingersticks, he checks glucose once daily.  He usually checks in the morning.  Lately it has been a little "high".  Generally in 120-130s.  Recently had high Carb intake due to Passover holiday and had raised his sugar.  \par \par No history of polydipsia polyuria, no dry mouth, no blurry vision.  Currently no chest pain or shortness of breath.\par \par Generally eat a healthy diet. \par Breakfast: Oatmeal or half a bagel with a slice of cheese\par Lunch: Turkey sandwich\par Dinner: Some form of meat or chicken with vegetables, salad.  Usually a small piece of Carb (one slice of bread) with the salad.  \par \par Ophthalmologist: Fazal Medel;  he will need a follow up appointment.  \par Podiatrist: He follows with Dr. Levin, up to date.  \par \par Regarding hyperlipidemia, currently on atorvastatin 40 mg once daily.  No side effects.\par

## 2021-07-13 NOTE — ASSESSMENT
[FreeTextEntry1] : Patient is a 66-year-old man with history of type 2 diabetes, hyperlipidemia, hypertension, vitamin D deficiency, here for endocrinology follow-up.\par \par 1.  Type 2 diabetes\par Patient reports a normal stress from his work, works at an , and his mother is now ill with a lot of health issues including compression fractures, excessive weight gain.\par December 4, 2020, A1c 9.1%, A1c improved a little bit to 8.7% today, April 12, 2021.\par Microalbumin check in December 4, 2020, 29 mg/g\par Recommend to continue with Metformin 1000 twice daily\par Trulicity 3.0 mg once weekly\par Jardiance 10 mg once daily, recommend increase to 25mg once daily.  (will send script today) \par Needs to go back ot the gym.  \par Needs to make an ophthalmologist \par Podiatry visit up to date, follows up with Dr. Levin\par Needs to go back to the gym, and A1C has been worsening.  \par Checking glucose once daily, run 100-110 mg/dl. But only checking in AM.  \par \par 2.  Hyperlipidemia\par LDL level is 48 December 4, 2020\par Triglyceride elevated at 283 mg/dL\par Continue with statin therapy Lipitor 40 mg\par \par 3.  Vitamin D deficiency\par Vitamin D 25 28 ng/ml\par Recommend Vitamin D 2000 IU daily.  \par

## 2021-07-30 ENCOUNTER — RX RENEWAL (OUTPATIENT)
Age: 66
End: 2021-07-30

## 2021-10-19 ENCOUNTER — APPOINTMENT (OUTPATIENT)
Dept: ENDOCRINOLOGY | Facility: CLINIC | Age: 66
End: 2021-10-19

## 2021-10-25 ENCOUNTER — APPOINTMENT (OUTPATIENT)
Dept: ENDOCRINOLOGY | Facility: CLINIC | Age: 66
End: 2021-10-25
Payer: MEDICARE

## 2021-10-25 VITALS
DIASTOLIC BLOOD PRESSURE: 70 MMHG | HEART RATE: 75 BPM | WEIGHT: 225 LBS | SYSTOLIC BLOOD PRESSURE: 128 MMHG | OXYGEN SATURATION: 95 % | BODY MASS INDEX: 30.52 KG/M2 | TEMPERATURE: 98.3 F

## 2021-10-25 LAB
GLUCOSE BLDC GLUCOMTR-MCNC: 185
HBA1C MFR BLD HPLC: 8.6

## 2021-10-25 PROCEDURE — 82962 GLUCOSE BLOOD TEST: CPT

## 2021-10-25 PROCEDURE — 83036 HEMOGLOBIN GLYCOSYLATED A1C: CPT | Mod: QW

## 2021-10-25 PROCEDURE — 99214 OFFICE O/P EST MOD 30 MIN: CPT | Mod: 25

## 2021-10-25 NOTE — ASSESSMENT
[FreeTextEntry1] : Patient is a 66-year-old man with history of type 2 diabetes, hyperlipidemia, hypertension, vitamin D deficiency, here for endocrinology follow-up.\par \par 1.  Type 2 diabetes\par A1c today 8.6% which has been stable but currently not at goal.\par Patient reports a normal stress from his work, works at an , and his mother is now ill with a lot of health issues including compression fractures, excessive weight gain.\par Microalbumin check in December 4, 2020, 29 mg/g\par Recommend to continue with Metformin 1000 twice daily\par Trulicity 3.0 mg once weekly\par Jardiance 25mg once daily  \par He hasn't gotten back to the gym yet.  He used to go to the gym every day, half an hour of cardio and weight. \par Needs to make an ophthalmologist; he hasn't made an appointment yet.  \par We discussed increasing Trulicity to 4.5 mg once weekly.\par For now he does not want to change any of his diabetic regimen.  He would like to follow-up in 3 months to see if there is any improvement after starting exercise and eating better.\par Discussed carb consistent diet\par Exercise: Discussed the regular exercises are beneficial in type 2 diabetes, independent of weight loss.  Encouraged to decrease sedentary time and perform 30 to 60 minutes of moderate intensity aerobic exercise on most days of the week, at least 150 minutes of moderate intensity aerobic exercise per week.\par Recommend to check glucose fasting, and 2 hours postprandially on certain meals to see any of the fluid content is spiking his glucose to above 180 mg/dL.\par Ophthalmology: Up-to-date\par Podiatry: Up-to-date follows up with Dr. Levin\par \par 2.  Hyperlipidemia\par LDL level is 48 December 4, 2020\par Triglyceride elevated at 283 mg/dL\par Continue with statin therapy Lipitor 40 mg\par \par 3.  Vitamin D deficiency\par Vitamin D 25 28 ng/ml\par Recommend Vitamin D 2000 IU daily.  \par \par Follow-up in 3 months for close monitoring of A1c\par If his A1c is still elevated, recommend to increase Trulicity to 4.5 mg once weekly.\par  [Diabetes Foot Care] : diabetes foot care [Long Term Vascular Complications] : long term vascular complications of diabetes [Carbohydrate Consistent Diet] : carbohydrate consistent diet [Importance of Diet and Exercise] : importance of diet and exercise to improve glycemic control, achieve weight loss and improve cardiovascular health [Exercise/Effect on Glucose] : exercise/effect on glucose [Hypoglycemia Management] : hypoglycemia management [Glucagon Use] : glucagon use [Ketone Testing] : ketone testing [Action and use of Insulin] : action and use of short and long-acting insulin [Self Monitoring of Blood Glucose] : self monitoring of blood glucose [Insulin Self-Administration] : insulin self-administration [Injection Technique, Storage, Sharps Disposal] : injection technique, storage, and sharps disposal [Sick-Day Management] : sick-day management [Retinopathy Screening] : Patient was referred to ophthalmology for retinopathy screening

## 2021-10-25 NOTE — QUALITY MEASURES
[Visual inspection, sensory exam] : Foot exam, including visual inspection, sensory exam with mono filament, and pulse exam, was performed within the last 12 months [NoLowerExtremityNeuroExam] : Regular follow-up with Dr. gallagher

## 2021-10-25 NOTE — HISTORY OF PRESENT ILLNESS
[FreeTextEntry1] : 66 - year-old man with history of type 2 diabetes, hyperlipidemia, here to follow-up for type 2 diabetes\par \par Patient diagnosed with type 2 diabetes 30 years ago.  \par No history of diabetic retinopathy neuropathy or nephropathy\par \par Current regimen includes Metformin 1000 mg twice daily, Jardiance 25 mg once daily and Trulicity 3.0 mg once weekly. \par \par Patient checks fingersticks, he checks glucose once daily.  He usually checks in the morning.  Lately it has been a little "high".  \par \par Generally in 120-130s.  Recently had high Carb intake due to Passover holiday and had raised his sugar.  \par \par No history of polydipsia polyuria, no dry mouth, no blurry vision.  Currently no chest pain or shortness of breath.\par \par Generally eat a healthy diet. \par Breakfast: Oatmeal or half a bagel with a slice of cheese, large cup of coffee.  \par Lunch: Turkey sandwich, usually a sandwich.  \par Dinner: Some form of meat or chicken with vegetables, salad.  Usually a small piece of Carb (one slice of bread) with the salad.  \par \par Ophthalmologist: Fazal Medel;  he will need a follow up appointment.  \par Podiatrist: He follows with Dr. Levin, up to date.  \par \par Regarding hyperlipidemia, currently on atorvastatin 40 mg once daily.  No side effects.\par

## 2021-12-30 ENCOUNTER — RX RENEWAL (OUTPATIENT)
Age: 66
End: 2021-12-30

## 2022-02-16 ENCOUNTER — RESULT CHARGE (OUTPATIENT)
Age: 67
End: 2022-02-16

## 2022-02-16 ENCOUNTER — APPOINTMENT (OUTPATIENT)
Dept: ENDOCRINOLOGY | Facility: CLINIC | Age: 67
End: 2022-02-16
Payer: MEDICARE

## 2022-02-16 ENCOUNTER — NON-APPOINTMENT (OUTPATIENT)
Age: 67
End: 2022-02-16

## 2022-02-16 VITALS — WEIGHT: 225 LBS | HEIGHT: 72 IN | BODY MASS INDEX: 30.48 KG/M2

## 2022-02-16 LAB — HBA1C MFR BLD HPLC: 7.8

## 2022-02-16 PROCEDURE — G0108 DIAB MANAGE TRN  PER INDIV: CPT

## 2022-05-16 ENCOUNTER — APPOINTMENT (OUTPATIENT)
Dept: ENDOCRINOLOGY | Facility: CLINIC | Age: 67
End: 2022-05-16

## 2022-06-30 ENCOUNTER — RX RENEWAL (OUTPATIENT)
Age: 67
End: 2022-06-30

## 2022-07-08 ENCOUNTER — APPOINTMENT (OUTPATIENT)
Dept: ENDOCRINOLOGY | Facility: CLINIC | Age: 67
End: 2022-07-08

## 2022-07-08 VITALS
TEMPERATURE: 97 F | WEIGHT: 225.8 LBS | SYSTOLIC BLOOD PRESSURE: 140 MMHG | BODY MASS INDEX: 30.58 KG/M2 | HEART RATE: 99 BPM | HEIGHT: 72 IN | DIASTOLIC BLOOD PRESSURE: 68 MMHG | OXYGEN SATURATION: 95 %

## 2022-07-08 DIAGNOSIS — Z00.00 ENCOUNTER FOR GENERAL ADULT MEDICAL EXAMINATION W/OUT ABNORMAL FINDINGS: ICD-10-CM

## 2022-07-08 PROCEDURE — 99214 OFFICE O/P EST MOD 30 MIN: CPT

## 2022-07-08 PROCEDURE — 82962 GLUCOSE BLOOD TEST: CPT

## 2022-07-08 PROCEDURE — 83036 HEMOGLOBIN GLYCOSYLATED A1C: CPT | Mod: QW

## 2022-07-08 RX ORDER — DULAGLUTIDE 3 MG/.5ML
3 INJECTION, SOLUTION SUBCUTANEOUS
Qty: 3 | Refills: 4 | Status: DISCONTINUED | COMMUNITY
Start: 2020-12-04 | End: 2022-07-08

## 2022-07-08 RX ORDER — DULAGLUTIDE 1.5 MG/.5ML
1.5 INJECTION, SOLUTION SUBCUTANEOUS
Qty: 3 | Refills: 1 | Status: DISCONTINUED | OUTPATIENT
Start: 2017-03-15 | End: 2022-07-08

## 2022-07-08 NOTE — HISTORY OF PRESENT ILLNESS
[FreeTextEntry1] : 66 - year-old man with history of type 2 diabetes, hyperlipidemia, here to follow-up for type 2 diabetes\par \par Patient diagnosed with type 2 diabetes 30 years ago.  \par No history of diabetic retinopathy neuropathy or nephropathy\par \par Current regimen includes Metformin 1000 mg twice daily, Jardiance 25 mg once daily and Trulicity 3.0 mg once weekly. \par \par He states that he is stress about work.  Jardiance and Trulicity is costing a lot of money for him.  Greater than $600 per 3 month combined.\par \par Patient checks fingersticks, he checks glucose once daily.  He usually checks in the morning. \par \par No history of polydipsia polyuria, no dry mouth, no blurry vision.  Currently no chest pain or shortness of breath.\par \par Ophthalmologist: Fazal Medel;  he will need a follow up appointment.  \par Podiatrist: He follows with Dr. Levin, up to date.  \par \par Regarding hyperlipidemia, currently on atorvastatin 40 mg once daily.  No side effects.\par

## 2022-07-11 ENCOUNTER — RX RENEWAL (OUTPATIENT)
Age: 67
End: 2022-07-11

## 2022-07-11 LAB
GLUCOSE BLDC GLUCOMTR-MCNC: 262
HBA1C MFR BLD HPLC: 8.2

## 2022-08-29 ENCOUNTER — APPOINTMENT (OUTPATIENT)
Dept: ENDOCRINOLOGY | Facility: CLINIC | Age: 67
End: 2022-08-29

## 2022-09-29 ENCOUNTER — RX RENEWAL (OUTPATIENT)
Age: 67
End: 2022-09-29

## 2022-12-19 ENCOUNTER — APPOINTMENT (OUTPATIENT)
Dept: ENDOCRINOLOGY | Facility: CLINIC | Age: 67
End: 2022-12-19

## 2022-12-19 ENCOUNTER — RESULT CHARGE (OUTPATIENT)
Age: 67
End: 2022-12-19

## 2022-12-19 VITALS
BODY MASS INDEX: 30.75 KG/M2 | DIASTOLIC BLOOD PRESSURE: 80 MMHG | WEIGHT: 227 LBS | OXYGEN SATURATION: 97 % | HEIGHT: 72 IN | SYSTOLIC BLOOD PRESSURE: 140 MMHG | HEART RATE: 85 BPM

## 2022-12-19 LAB
GLUCOSE BLDC GLUCOMTR-MCNC: 227
HBA1C MFR BLD HPLC: 11.5

## 2022-12-19 PROCEDURE — 83036 HEMOGLOBIN GLYCOSYLATED A1C: CPT | Mod: QW

## 2022-12-19 PROCEDURE — 99214 OFFICE O/P EST MOD 30 MIN: CPT

## 2022-12-19 NOTE — ASSESSMENT
[FreeTextEntry1] : Patient is a 67-year-old man with history of type 2 diabetes, hyperlipidemia, hypertension, vitamin D deficiency, here for endocrinology follow-up.\par \par 1.  Type 2 diabetes\par A1c today 8.2% 07/08/2022, A1c today much worsened to 11.5% 12/19/2022.\par His mother just enter hospice.  Under a lot of stress.\par Patient reports a normal stress from his work, works at an . \par Recommend to continue with Metformin 1000 twice daily\par Recommend to increase Trulicity to 4.5 mg once weekly. (NOT ON ANY DUE TO \par Jardiance 25mg once daily  \par Discussed the possibility of starting basal insulin.  He does not do well with needles.  He is able to use Trulicity due to his autoinjector function.\par \par He hasn't been able get Trulicity 4.5 MG once weekly due to shortage.  \par Also running out of Jardiance 25mg tablets, will need insurance to renew next year. \par \par We only have Mounjaro as a sample in our office.  Provided patient with 4 pens to bridge him through the new year.  At that time hopefully be able to restart Trulicity at 4.5 mg once weekly.  Patient's pharmacy was instructed to contact me if any issue refilling his medication.\par \par Discussed carb consistent diet\par Exercise: Discussed the regular exercises are beneficial in type 2 diabetes, independent of weight loss.  Encouraged to decrease sedentary time and perform 30 to 60 minutes of moderate intensity aerobic exercise on most days of the week, at least 150 minutes of moderate intensity aerobic exercise per week.\par Recommend to check glucose fasting, and 2 hours postprandially on certain meals to see any of the fluid content is spiking his glucose to above 180 mg/dL.\par Ophthalmology: Up-to-date\par Podiatry: Up-to-date follows up with Dr. Levin\par \par 2.  Hyperlipidemia\par LDL level is 48 December 4, 2020\par Triglyceride elevated at 283 mg/dL\par Continue with statin therapy Lipitor 40 mg\par \par 3.  Vitamin D deficiency\par Vitamin D 25 28 ng/ml\par Recommend Vitamin D 2000 IU daily.  \par \par Follow-up in 3 months with NP.\par Will obtain blood work.  We will mail prescription to patient's house.\par

## 2022-12-19 NOTE — THERAPY
[FreeTextEntry7] : Metformin 1000 mg twice daily\par Jardiance 25 mg once daily\par Trulicity 4.5 mg once weekly.

## 2023-02-22 ENCOUNTER — RX RENEWAL (OUTPATIENT)
Age: 68
End: 2023-02-22

## 2023-03-21 ENCOUNTER — APPOINTMENT (OUTPATIENT)
Dept: ENDOCRINOLOGY | Facility: CLINIC | Age: 68
End: 2023-03-21
Payer: MEDICARE

## 2023-03-21 VITALS
HEART RATE: 92 BPM | HEIGHT: 72 IN | OXYGEN SATURATION: 98 % | DIASTOLIC BLOOD PRESSURE: 77 MMHG | WEIGHT: 220 LBS | BODY MASS INDEX: 29.8 KG/M2 | SYSTOLIC BLOOD PRESSURE: 142 MMHG

## 2023-03-21 PROCEDURE — 99214 OFFICE O/P EST MOD 30 MIN: CPT

## 2023-03-21 NOTE — ASSESSMENT
[FreeTextEntry1] : Patient is a 67-year-old man with history of type 2 diabetes, hyperlipidemia, hypertension, vitamin D deficiency, here for endocrinology follow-up.\par \par 1.  Type 2 diabetes\par A1c 8.2% 07/08/2022, Much worsened to 11.5% 12/19/2022.\par Unable to do A1c in office, will check on labs. \par Need to stop Trulicity and Jardiance for cost. \par \par Regimen: Trulicity 4.5, Metformin 1000 mg BID, Jardiance 25 mg \par \par Injections- not a great option for him. He does not want needles. \par Discussed alternative therapies. I discussed I will need more glucose data to assess. Will collaborate with Dr. Haji. \par Last in 2007 went to cardiology, discussed pioglitazone risk for swelling. No known history of heart failure, history of heart failure in family. \par Discussed once daily sulfonylurea such as Glipizide. If he does well on this we could potentially combine Metformin and Glipizide to reduce pill burden. \par Offered CGM, he can do that after passover, he declines now. \par \par Discussed carb consistent diet\par Exercise: Discussed the regular exercises are beneficial in type 2 diabetes, independent of weight loss.  Encouraged to decrease sedentary time and perform 30 to 60 minutes of moderate intensity aerobic exercise on most days of the week, at least 150 minutes of moderate intensity aerobic exercise per week.\par Recommend to check glucose fasting, and 2 hours postprandially on certain meals to see any of the fluid content is spiking his glucose to above 180 mg/dL.\par Ophthalmology: Up-to-date went 2022. \par Podiatry: Up-to-date follows up with Dr. Levin\par \par 2.  Hyperlipidemia\par LDL level is 48 December 4, 2020\par Triglyceride elevated at 283 mg/dL\par Continue with statin therapy Lipitor 40 mg\par \par 3.  Vitamin D deficiency\par Vitamin D 25 28 ng/ml\par Recommend Vitamin D 2000 IU daily.  \par \par  Check labs today.

## 2023-03-21 NOTE — HISTORY OF PRESENT ILLNESS
[FreeTextEntry1] : 67 - year-old man with history of type 2 diabetes, hyperlipidemia, here to follow-up for type 2 diabetes.\par \par His mother just enter hospice.  Under a lot of stress.\par Patient reports a normal stress from his work, works at an . \par \par Patient diagnosed with type 2 diabetes 30 years ago.  \par No history of diabetic retinopathy neuropathy or nephropathy\par \par Current regimen includes Metformin 1000 mg twice daily, Jardiance 25 mg once daily and Trulicity 3.0 mg once weekly. \par \par A1c machine we cannot do POC a1c today. \par \par 3/12/23:\par Medications are very expensive. \par His mother is in hospice, and this is expensive. \par He states that he is stress about work.  Jardiance and Trulicity is costing a lot of money for him.  Greater than $600 per 3 month combined.\par \par Meter Readings:\par Checking once in the mornins-up to 200s. Depending on the day. \par \par No history of polydipsia polyuria, no dry mouth, no blurry vision.  Currently no chest pain or shortness of breath.\par \par Ophthalmologist: Fazal Medel;  he will need a follow up appointment.  \par Podiatrist: He follows with Dr. Levin, up to date.  \par \par Regarding hyperlipidemia, currently on atorvastatin 40 mg once daily.  No side effects.\par

## 2023-03-23 LAB
25(OH)D3 SERPL-MCNC: 39.9 NG/ML
ALBUMIN SERPL ELPH-MCNC: 4.8 G/DL
ALP BLD-CCNC: 97 U/L
ALT SERPL-CCNC: 26 U/L
ANION GAP SERPL CALC-SCNC: 18 MMOL/L
AST SERPL-CCNC: 21 U/L
BILIRUB SERPL-MCNC: 0.5 MG/DL
BUN SERPL-MCNC: 23 MG/DL
CALCIUM SERPL-MCNC: 10.1 MG/DL
CHLORIDE SERPL-SCNC: 99 MMOL/L
CHOLEST SERPL-MCNC: 199 MG/DL
CO2 SERPL-SCNC: 22 MMOL/L
CREAT SERPL-MCNC: 0.87 MG/DL
CREAT SPEC-SCNC: 36 MG/DL
EGFR: 95 ML/MIN/1.73M2
ESTIMATED AVERAGE GLUCOSE: 286 MG/DL
GLUCOSE SERPL-MCNC: 275 MG/DL
HBA1C MFR BLD HPLC: 11.6 %
HDLC SERPL-MCNC: 48 MG/DL
LDLC SERPL CALC-MCNC: 89 MG/DL
MICROALBUMIN 24H UR DL<=1MG/L-MCNC: <1.2 MG/DL
MICROALBUMIN/CREAT 24H UR-RTO: NORMAL MG/G
NONHDLC SERPL-MCNC: 150 MG/DL
POTASSIUM SERPL-SCNC: 4.4 MMOL/L
PROT SERPL-MCNC: 7.4 G/DL
SODIUM SERPL-SCNC: 139 MMOL/L
T4 FREE SERPL-MCNC: 1.5 NG/DL
TRIGL SERPL-MCNC: 307 MG/DL
TSH SERPL-ACNC: 0.86 UIU/ML

## 2023-03-23 RX ORDER — DEXTROSE 3.75 G
4 TABLET,CHEWABLE ORAL
Qty: 1 | Refills: 0 | Status: ACTIVE | COMMUNITY
Start: 2023-03-23 | End: 1900-01-01

## 2023-05-18 ENCOUNTER — APPOINTMENT (OUTPATIENT)
Dept: ENDOCRINOLOGY | Facility: CLINIC | Age: 68
End: 2023-05-18
Payer: MEDICARE

## 2023-05-18 VITALS
OXYGEN SATURATION: 96 % | DIASTOLIC BLOOD PRESSURE: 65 MMHG | HEIGHT: 72 IN | SYSTOLIC BLOOD PRESSURE: 140 MMHG | HEART RATE: 71 BPM

## 2023-05-18 PROCEDURE — 99214 OFFICE O/P EST MOD 30 MIN: CPT

## 2023-05-18 NOTE — QUALITY MEASURES
[Visual inspection, sensory exam] : Foot exam, including visual inspection, sensory exam with mono filament, and pulse exam, was performed within the last 12 months
No

## 2023-05-18 NOTE — ASSESSMENT
[FreeTextEntry1] : Patient is a 67-year-old man with history of type 2 diabetes, hyperlipidemia, hypertension, vitamin D deficiency, here for endocrinology follow-up.\par \par 1.  Type 2 diabetes\par A1c 8.2% 07/08/2022, Much worsened to 11.5% 12/19/2022.\par   A1c was markedly elevated at 11.6% 3/21/2023\par \par Patient declines injectable therapy.\par We are limited in using sulfonylurea classes the patient has a sulfa allergy.\par The patient was unable to continue on therapy with Jardiance and Trulicity due to the high cost of medications.\par Last in 2007 went to cardiology, discussed pioglitazone risk for swelling. No known history of heart failure, history of heart failure in family. \par Discussed with the patient at today's visit that because we do not have any glucometer data that he would greatly benefit from doing a professional CGM sensor.\par Recommend CGM. Professional CGM today. \par \par Plan:\par Metformin 1000 mg BID\par Repaglinide 2 mg before each meal. \par RTC Maxwell 2 evaluation, will mail back. \par \par Discussed carb consistent diet\par Exercise: Discussed the regular exercises are beneficial in type 2 diabetes, independent of weight loss.  Encouraged to decrease sedentary time and perform 30 to 60 minutes of moderate intensity aerobic exercise on most days of the week, at least 150 minutes of moderate intensity aerobic exercise per week.\par Recommend to check glucose fasting, and 2 hours postprandially on certain meals to see any of the fluid content is spiking his glucose to above 180 mg/dL.\par Ophthalmology: Up-to-date went 2022. \par Podiatry: Up-to-date follows up with Dr. Levin\par \par 2.  Hyperlipidemia\par   Triglycerides elevated at 307, LDL 89, 3/23/2023, triglycerides likely elevated previously due to worsening glycemic control.\par Continue with statin therapy Lipitor 40 mg\par \par 3.  Vitamin D deficiency\par   Vitamin D normalized to 39.9 3/23/2023, recommend continue with vitamin D 2000 IU daily.\par \par RTC. with Dr. Haji July.

## 2023-05-18 NOTE — HISTORY OF PRESENT ILLNESS
[FreeTextEntry1] : 68 - year-old man with history of type 2 diabetes, hyperlipidemia, here to follow-up for type 2 diabetes.\par \par His mother just entered hospice.  Under a lot of stress.\par Patient reports a normal stress from his work, works as an . \par Following with neurology for heaviness in the legs, difficulty walking. Two days of MRIs, 32 blood tests, he is doing physical therapy. \par Neurologist: Dr. Darion Vieira. \par \par Patient diagnosed with type 2 diabetes 30 years ago.  \par No history of diabetic retinopathy neuropathy or nephropathy\par \par Current Medications:\par Metformin 1000 mg BID\par Repaglinide 2 mg before each meal. \par \par Previous Medications:\par Stopped due to cost: Jardiance 25 mg once daily and Trulicity 3.0 mg once weekly. \par \par 3/12/23:\par Medications are very expensive. \par His mother is in hospice, and this is expensive. \par He states that he is stress about work.  Jardiance and Trulicity is costing a lot of money for him.  Greater than $600 per 3 month combined.\par \par A1c 3/21/2023: Markedly elevated at 11.6%.\par \par Meter Readings:\par No meter today. \par AM fastings: 110s as per verbal report. \par \par No history of polydipsia polyuria, no dry mouth, no blurry vision.  Currently no chest pain or shortness of breath.\par \par Ophthalmologist: Fazal Medel; he is up to date with optho. \par Podiatrist: He follows with Dr. Levin, up to date.  \par \par Regarding hyperlipidemia, currently on atorvastatin 40 mg once daily.  No side effects.\par

## 2023-05-22 ENCOUNTER — OFFICE (OUTPATIENT)
Dept: URBAN - METROPOLITAN AREA CLINIC 77 | Facility: CLINIC | Age: 68
Setting detail: OPHTHALMOLOGY
End: 2023-05-22
Payer: MEDICARE

## 2023-05-22 DIAGNOSIS — H51.11: ICD-10-CM

## 2023-05-22 DIAGNOSIS — H21.233: ICD-10-CM

## 2023-05-22 DIAGNOSIS — H50.10: ICD-10-CM

## 2023-05-22 DIAGNOSIS — H02.403: ICD-10-CM

## 2023-05-22 DIAGNOSIS — H40.013: ICD-10-CM

## 2023-05-22 DIAGNOSIS — G70.01: ICD-10-CM

## 2023-05-22 DIAGNOSIS — D31.31: ICD-10-CM

## 2023-05-22 DIAGNOSIS — H53.2: ICD-10-CM

## 2023-05-22 DIAGNOSIS — E11.9: ICD-10-CM

## 2023-05-22 DIAGNOSIS — D31.30: ICD-10-CM

## 2023-05-22 PROCEDURE — 92083 EXTENDED VISUAL FIELD XM: CPT | Performed by: OPHTHALMOLOGY

## 2023-05-22 PROCEDURE — 92250 FUNDUS PHOTOGRAPHY W/I&R: CPT | Performed by: OPHTHALMOLOGY

## 2023-05-22 PROCEDURE — 99214 OFFICE O/P EST MOD 30 MIN: CPT | Performed by: OPHTHALMOLOGY

## 2023-05-22 ASSESSMENT — PACHYMETRY
OS_CT_CORRECTION: 3
OS_CT_UM: 503
OD_CT_UM: 501
OD_CT_CORRECTION: 3

## 2023-05-22 ASSESSMENT — TONOMETRY
OD_IOP_MMHG: 17
OS_IOP_MMHG: 17

## 2023-05-22 ASSESSMENT — REFRACTION_AUTOREFRACTION
OD_CYLINDER: -1.25
OS_SPHERE: +1.50
OD_SPHERE: +1.75
OS_AXIS: 35
OS_CYLINDER: -0.25
OD_AXIS: 90

## 2023-05-22 ASSESSMENT — KERATOMETRY
OD_K2POWER_DIOPTERS: 44.00
OD_AXISANGLE_DEGREES: 013
OS_AXISANGLE_DEGREES: 112
OS_K2POWER_DIOPTERS: 43.75
OD_K1POWER_DIOPTERS: 42.5
OS_K1POWER_DIOPTERS: 43.75

## 2023-05-22 ASSESSMENT — REFRACTION_CURRENTRX
OS_OVR_VA: 20/
OS_SPHERE: +0.75
OD_CYLINDER: -0.50
OD_AXIS: 106
OD_SPHERE: +0.50
OD_OVR_VA: 20/

## 2023-05-22 ASSESSMENT — REFRACTION_MANIFEST
OD_ADD: +2.50
OS_VA1: 20/25
OS_SPHERE: +1.00
OS_ADD: +2.50
OD_CYLINDER: -0.50
OD_AXIS: 90
OD_SPHERE: +1.00
OD_VA1: 20/25

## 2023-05-22 ASSESSMENT — AXIALLENGTH_DERIVED
OD_AL: 23.3932
OS_AL: 22.9806
OD_AL: 23.2504

## 2023-05-22 ASSESSMENT — SPHEQUIV_DERIVED
OD_SPHEQUIV: 0.75
OS_SPHEQUIV: 1.375
OD_SPHEQUIV: 1.125

## 2023-05-22 ASSESSMENT — VISUAL ACUITY
OD_BCVA: 20/30-
OS_BCVA: 20/25-

## 2023-05-22 ASSESSMENT — CONFRONTATIONAL VISUAL FIELD TEST (CVF)
OD_FINDINGS: FULL
OS_FINDINGS: FULL

## 2023-05-22 ASSESSMENT — LID POSITION - PTOSIS
OD_PTOSIS: 1+ 2+
OS_PTOSIS: 3+

## 2023-06-08 RX ORDER — TIRZEPATIDE 5 MG/.5ML
5 INJECTION, SOLUTION SUBCUTANEOUS
Qty: 4 | Refills: 5 | Status: DISCONTINUED | COMMUNITY
Start: 2022-12-21 | End: 2023-06-08

## 2023-07-18 ENCOUNTER — APPOINTMENT (OUTPATIENT)
Dept: ENDOCRINOLOGY | Facility: CLINIC | Age: 68
End: 2023-07-18
Payer: MEDICARE

## 2023-07-18 VITALS
DIASTOLIC BLOOD PRESSURE: 80 MMHG | WEIGHT: 243 LBS | SYSTOLIC BLOOD PRESSURE: 162 MMHG | BODY MASS INDEX: 32.91 KG/M2 | OXYGEN SATURATION: 96 % | HEART RATE: 71 BPM | HEIGHT: 72 IN

## 2023-07-18 PROCEDURE — 99214 OFFICE O/P EST MOD 30 MIN: CPT

## 2023-07-18 PROCEDURE — 83036 HEMOGLOBIN GLYCOSYLATED A1C: CPT | Mod: QW

## 2023-07-18 RX ORDER — EMPAGLIFLOZIN 25 MG/1
25 TABLET, FILM COATED ORAL
Qty: 30 | Refills: 1 | Status: DISCONTINUED | COMMUNITY
Start: 2019-08-09 | End: 2023-07-18

## 2023-07-18 RX ORDER — DULAGLUTIDE 4.5 MG/.5ML
4.5 INJECTION, SOLUTION SUBCUTANEOUS
Qty: 3 | Refills: 1 | Status: ACTIVE | COMMUNITY
Start: 2022-07-08 | End: 1900-01-01

## 2023-07-18 NOTE — ASSESSMENT
[FreeTextEntry1] : Patient is a 68-year-old man with history of type 2 diabetes, hyperlipidemia, hypertension, vitamin D deficiency, here for endocrinology follow-up.\par \par 1.  Type 2 diabetes\par A1c today 8.2% 07/08/2022, A1c today much worsened to 11.5% 12/19/2022.\par His mother just enter hospice.  Under a lot of stress.\par Patient reports a normal stress from his work, works at an . \par Recommend Lantus 22 units at bedtime, if fasting glucose greater than 140 mg/dL, then increase to 24 units.\par Prandin 2 mg three times daily \par Trulicity is too expensive for him. \par Jardiance 25 mg extremely expensive as well.\par New glucometer prescribed for patient.\par Patient to monitor glucose before every meal and at bedtime.\par If postprandial glucose is greater than 200 mg/dL.  Despite being on repaglinide, patient to contact me, at that time he will most likely need to start mealtime insulin, likely Humalog/Admelog/NovoLog at a 6 to 8 units 3 times daily with meals\par \par Discussed carb consistent diet\par Exercise: Discussed the regular exercises are beneficial in type 2 diabetes, independent of weight loss.  Encouraged to decrease sedentary time and perform 30 to 60 minutes of moderate intensity aerobic exercise on most days of the week, at least 150 minutes of moderate intensity aerobic exercise per week.\par Recommend to check glucose fasting, and 2 hours postprandially on certain meals to see any of the fluid content is spiking his glucose to above 180 mg/dL.\par Ophthalmology: Up-to-date\par Podiatry: Up-to-date follows up with Dr. Levin\par \par 2.  Hyperlipidemia\par LDL goal <70 mg/dL\par Continue with statin therapy, currently on Lipitor 40 mg once daily.\par \par 3.  Vitamin D deficiency\par Normal vitamin D level normal in March 2021\par Continue vitamin D 2000 IU daily.\par \par Follow-up in 3 months with NP.\par Will obtain blood work.  We will mail prescription to patient's house.\par \par Patient states that he is going to start treatment for CIDP with his neurologist Dr. Darion Vieira.  We will need to contact the office to find out about his treatment regimen.  If they are glucocorticoid involved, recommend adjustment of his insulin therapy.  I will have the RN reach out to Dr. Vieira's office.\par \par Chronic Inflammatory Demyelinating Polyneuropathy (CIDP) treatment started \par [phone]\par Dr. Darion Vieira MD

## 2023-07-18 NOTE — THERAPY
[FreeTextEntry7] : Lantus 20 units at bedtime\par Metformin 2000 mg twice daily\par Repaglinide 2 mg 3 times daily with meals

## 2023-07-18 NOTE — HISTORY OF PRESENT ILLNESS
[FreeTextEntry1] : 68-year-old man with history of type 2 diabetes, hypertension, hyperlipidemia, here for endocrine follow-up for type 2 diabetes\par \par His mother recently passed away, still under a lot of stress.\par \par Diagnosed with type 2 diabetes more than 30 years ago.\par No history of diabetic retinopathy, neuropathy or nephropathy.\par \par Patient is currently taking Lantus 20 units at bedtime, repaglinide 2 mg 3 times daily with meals, metformin at 1000 mg twice daily\par He was on Trulicity before but has extreme high co-pay.\par He was also on Jardiance in the past again he is in the Medicare donut hole and unable to afford his medication.\par Currently stressed financially as per patient.\par \par Regarding hyperlipidemia on atorvastatin 40 mg once daily\par \par Regarding hypertension, patient is currently not taking Jardiance secondary to the cost of the medication.

## 2023-07-20 LAB — HBA1C MFR BLD HPLC: 12.3

## 2023-07-20 RX ORDER — LANCETS 30 GAUGE
EACH MISCELLANEOUS
Qty: 400 | Refills: 1 | Status: ACTIVE | COMMUNITY
Start: 2023-07-20 | End: 1900-01-01

## 2023-07-21 RX ORDER — BLOOD-GLUCOSE METER
W/DEVICE EACH MISCELLANEOUS
Qty: 1 | Refills: 0 | Status: ACTIVE | COMMUNITY
Start: 2018-05-11 | End: 1900-01-01

## 2023-10-18 ENCOUNTER — RX RENEWAL (OUTPATIENT)
Age: 68
End: 2023-10-18

## 2023-10-18 RX ORDER — REPAGLINIDE 2 MG/1
2 TABLET ORAL
Qty: 270 | Refills: 1 | Status: ACTIVE | COMMUNITY
Start: 2023-03-23 | End: 1900-01-01

## 2023-10-31 ENCOUNTER — APPOINTMENT (OUTPATIENT)
Dept: ENDOCRINOLOGY | Facility: CLINIC | Age: 68
End: 2023-10-31
Payer: MEDICARE

## 2023-10-31 VITALS
WEIGHT: 245 LBS | SYSTOLIC BLOOD PRESSURE: 146 MMHG | HEIGHT: 72 IN | OXYGEN SATURATION: 99 % | HEART RATE: 72 BPM | DIASTOLIC BLOOD PRESSURE: 78 MMHG | BODY MASS INDEX: 33.18 KG/M2

## 2023-10-31 PROCEDURE — 99214 OFFICE O/P EST MOD 30 MIN: CPT | Mod: 25

## 2023-10-31 RX ORDER — CHOLECALCIFEROL (VITAMIN D3) 10(400)/ML
DROPS ORAL
Qty: 2 | Refills: 1 | Status: ACTIVE | COMMUNITY
Start: 2023-10-31 | End: 1900-01-01

## 2023-10-31 RX ORDER — LANCING DEVICE
W/DEVICE EACH MISCELLANEOUS
Qty: 1 | Refills: 0 | Status: ACTIVE | COMMUNITY
Start: 2023-10-31 | End: 1900-01-01

## 2023-10-31 RX ORDER — LANCING DEVICE
EACH MISCELLANEOUS 3 TIMES DAILY
Qty: 5 | Refills: 1 | Status: ACTIVE | COMMUNITY
Start: 2023-10-31 | End: 1900-01-01

## 2023-10-31 RX ORDER — ALCOHOL ANTISEPTIC PADS
PADS, MEDICATED (EA) TOPICAL
Qty: 1 | Refills: 1 | Status: ACTIVE | COMMUNITY
Start: 2023-10-31 | End: 1900-01-01

## 2023-11-06 LAB
ANION GAP SERPL CALC-SCNC: 14 MMOL/L
BUN SERPL-MCNC: 18 MG/DL
CALCIUM SERPL-MCNC: 9.6 MG/DL
CHLORIDE SERPL-SCNC: 100 MMOL/L
CHOLEST SERPL-MCNC: 184 MG/DL
CO2 SERPL-SCNC: 22 MMOL/L
CREAT SERPL-MCNC: 0.7 MG/DL
EGFR: 100 ML/MIN/1.73M2
ESTIMATED AVERAGE GLUCOSE: 295 MG/DL
GLUCOSE SERPL-MCNC: 263 MG/DL
HBA1C MFR BLD HPLC: 11.9 %
HDLC SERPL-MCNC: 44 MG/DL
LDLC SERPL CALC-MCNC: 88 MG/DL
NONHDLC SERPL-MCNC: 140 MG/DL
POTASSIUM SERPL-SCNC: 4.3 MMOL/L
SODIUM SERPL-SCNC: 136 MMOL/L
TRIGL SERPL-MCNC: 314 MG/DL

## 2024-01-05 ENCOUNTER — RX RENEWAL (OUTPATIENT)
Age: 69
End: 2024-01-05

## 2024-01-05 RX ORDER — INSULIN GLARGINE 100 [IU]/ML
100 INJECTION, SOLUTION SUBCUTANEOUS
Qty: 15 | Refills: 1 | Status: ACTIVE | COMMUNITY
Start: 2023-06-08 | End: 1900-01-01

## 2024-02-07 RX ORDER — INSULIN DEGLUDEC INJECTION 100 U/ML
100 INJECTION, SOLUTION SUBCUTANEOUS
Qty: 2 | Refills: 1 | Status: ACTIVE | COMMUNITY
Start: 2024-02-07 | End: 1900-01-01

## 2024-02-12 RX ORDER — BLOOD SUGAR DIAGNOSTIC
STRIP MISCELLANEOUS
Qty: 400 | Refills: 3 | Status: ACTIVE | COMMUNITY
Start: 2023-07-18 | End: 1900-01-01

## 2024-02-27 ENCOUNTER — APPOINTMENT (OUTPATIENT)
Dept: ENDOCRINOLOGY | Facility: CLINIC | Age: 69
End: 2024-02-27
Payer: MEDICARE

## 2024-02-27 ENCOUNTER — RX RENEWAL (OUTPATIENT)
Age: 69
End: 2024-02-27

## 2024-02-27 VITALS
SYSTOLIC BLOOD PRESSURE: 124 MMHG | HEART RATE: 97 BPM | HEIGHT: 72 IN | OXYGEN SATURATION: 98 % | DIASTOLIC BLOOD PRESSURE: 68 MMHG | WEIGHT: 235 LBS | BODY MASS INDEX: 31.83 KG/M2

## 2024-02-27 DIAGNOSIS — E11.9 TYPE 2 DIABETES MELLITUS W/OUT COMPLICATIONS: ICD-10-CM

## 2024-02-27 DIAGNOSIS — E55.9 VITAMIN D DEFICIENCY, UNSPECIFIED: ICD-10-CM

## 2024-02-27 DIAGNOSIS — E11.42 TYPE 2 DIABETES MELLITUS WITH DIABETIC POLYNEUROPATHY: ICD-10-CM

## 2024-02-27 DIAGNOSIS — G61.81 CHRONIC INFLAMMATORY DEMYELINATING POLYNEURITIS: ICD-10-CM

## 2024-02-27 DIAGNOSIS — E78.5 HYPERLIPIDEMIA, UNSPECIFIED: ICD-10-CM

## 2024-02-27 DIAGNOSIS — D35.00 BENIGN NEOPLASM OF UNSPECIFIED ADRENAL GLAND: ICD-10-CM

## 2024-02-27 LAB
GLUCOSE BLDC GLUCOMTR-MCNC: 218
HBA1C MFR BLD HPLC: 9.9

## 2024-02-27 PROCEDURE — 99214 OFFICE O/P EST MOD 30 MIN: CPT

## 2024-02-27 PROCEDURE — G2211 COMPLEX E/M VISIT ADD ON: CPT

## 2024-02-27 PROCEDURE — 83036 HEMOGLOBIN GLYCOSYLATED A1C: CPT | Mod: QW

## 2024-02-27 PROCEDURE — 82962 GLUCOSE BLOOD TEST: CPT

## 2024-02-27 NOTE — HISTORY OF PRESENT ILLNESS
[FreeTextEntry1] : 68-year-old man with history of type 2 diabetes, hyperlipidemia, hypertension, vitamin D deficiency, CIDP here for endocrinology follow-up.  Of note, he was hospitalized in [facility] for hypoglycemia. The days prior, he had been feeling fatigued. Had eaten his meals, no recent illness, just constant fatigue. On January 27-28th, found to have low 30s at home and required hospitalization. FS was apparently low for 7d in the hospital. In the hospital stay, on cortisol and on a constant glucose gtt. FS in 200s-300s while on those medications. Since being discharged, FS in 200s-400s. Pre-AM meals, FS still 200s. No hypoglycemic episodes. Vega, veggies, brown rice but still 200s.  Currently on CIDP infusions (that include steroids): dexamethasone, Benadryl, Tylenol, gamunex-C, D5W, that started for a months now.

## 2024-02-27 NOTE — PHYSICAL EXAM
[Alert] : alert [Normal Outer Ear/Nose] : the ears and nose were normal in appearance [Normal Sclera/Conjunctiva] : normal sclera/conjunctiva [No Respiratory Distress] : no respiratory distress [Normal S1, S2] : normal S1 and S2 [Normal Rate] : heart rate was normal [Normal Bowel Sounds] : normal bowel sounds [Not Distended] : not distended [Soft] : abdomen soft

## 2024-02-27 NOTE — ASSESSMENT
[FreeTextEntry1] : 68-year-old man with history of type 2 diabetes, hyperlipidemia, hypertension, vitamin D deficiency, CIDP here for endocrinology follow-up.  1. Type 2 diabetes A1c 12.3% 7/18/2023 --> 9.9% today 2/27/2024 -on Lantus 22 units at bedtime, if fasting glucose greater than 140 mg/dL, then increase to 24 units. - will d/c prandin pre-meal and substitute with ISS pre-meal (starting at 5U at  and uptitration by 1U every 50 mEq of glucose) -on metformin 1000 mg BID. -Trulicity is too expensive for him. Jardiance extremely expensive as well. -podiatry/ophthalmology appt -on CIDP treatment q3 weeks - will speak to Dr Darion Vieira regarding treatment regimen -RTC in 1m to follow-up on FS  2. Adrenal nodule: -found in hospital CT scan reading -pt will get full CT read -f/u metanephrines, DHEAS, aldosterone, renin levels  3. Hyperlipidemia LDL goal <70 mg/dL -on atorvastatin 40 mg once daily, lipid panel 11/2023 appropriate   4. Vitamin D deficiency Normal vitamin D level normal in March 2023. Continue vitamin D 2000 IU daily.

## 2024-02-27 NOTE — END OF VISIT
[] : Resident [FreeTextEntry3] : 69-year-old man with history of uncontrolled type 2 diabetes, not on insulin and metformin 1000 mg twice daily.  Unable to afford SGLT2 inhibitor or GLP-1 receptor agonist secondary to the cost of the medication.  Patient was treated intermittently with dexamethasone for CIPD every 3 weeks.  In January 2024, patient was admitted to [facility] for hypoglycemia and concern for adrenal insufficiency, he was treated with dextrose drip and glucocorticoid replacement therapy and was seen by the endocrinology team. CGM now showing hyperglycemia consistently.  Patient's last treatment with CIPD was on Thursday last week. Will speak with neurologist and confirming the steroid dosing for his treatment. For now recommend starting mealtime insulin.  Plan: Lantus 24 units Humalog 5 units 3 times daily with meals with ISF of 1: 50 above 200  Continue with metformin 2000 mg twice daily  Incidentally also found to have 1.1 cm adrenal nodule.  Patient will form and a copy of the CT abdomen.  Will check hormones including metanephrines, DHEA-S lifelong, aldosterone renin level. Regarding hyperlipidemia, continue with statin therapy.

## 2024-02-29 RX ORDER — ATORVASTATIN CALCIUM 40 MG/1
40 TABLET, FILM COATED ORAL
Qty: 90 | Refills: 1 | Status: ACTIVE | COMMUNITY
Start: 2017-07-10 | End: 1900-01-01

## 2024-03-07 ENCOUNTER — APPOINTMENT (OUTPATIENT)
Dept: ENDOCRINOLOGY | Facility: CLINIC | Age: 69
End: 2024-03-07

## 2024-03-07 ENCOUNTER — APPOINTMENT (OUTPATIENT)
Dept: ENDOCRINOLOGY | Facility: CLINIC | Age: 69
End: 2024-03-07
Payer: MEDICARE

## 2024-03-07 PROCEDURE — G0108 DIAB MANAGE TRN  PER INDIV: CPT

## 2024-03-13 LAB
25(OH)D3 SERPL-MCNC: 33.6 NG/ML
ALBUMIN SERPL ELPH-MCNC: 4.2 G/DL
ALDOSTERONE SERUM: 4 NG/DL
ALP BLD-CCNC: 78 U/L
ALT SERPL-CCNC: 22 U/L
ANION GAP SERPL CALC-SCNC: 11 MMOL/L
AST SERPL-CCNC: 23 U/L
BILIRUB SERPL-MCNC: 0.3 MG/DL
BUN SERPL-MCNC: 15 MG/DL
CALCIUM SERPL-MCNC: 9.6 MG/DL
CHLORIDE SERPL-SCNC: 107 MMOL/L
CHOLEST SERPL-MCNC: 117 MG/DL
CO2 SERPL-SCNC: 24 MMOL/L
CREAT SERPL-MCNC: 0.79 MG/DL
CREAT SPEC-SCNC: 98 MG/DL
DHEA-S SERPL-MCNC: 32.5 UG/DL
DHEA-SULFATE, SERUM: <10 UG/DL
EGFR: 97 ML/MIN/1.73M2
GLUCOSE SERPL-MCNC: 208 MG/DL
HCT VFR BLD CALC: 36.4 %
HDLC SERPL-MCNC: 45 MG/DL
HGB BLD-MCNC: 12.1 G/DL
LDLC SERPL CALC-MCNC: 52 MG/DL
MCHC RBC-ENTMCNC: 28.7 PG
MCHC RBC-ENTMCNC: 33.2 GM/DL
MCV RBC AUTO: 86.5 FL
METANEPHRINE, PL: <25 PG/ML
MICROALBUMIN 24H UR DL<=1MG/L-MCNC: 1.8 MG/DL
MICROALBUMIN/CREAT 24H UR-RTO: 19 MG/G
NONHDLC SERPL-MCNC: 72 MG/DL
NORMETANEPHRINE, PL: 113.1 PG/ML
PLATELET # BLD AUTO: 266 K/UL
POTASSIUM SERPL-SCNC: 4.4 MMOL/L
PROT SERPL-MCNC: 7.1 G/DL
RBC # BLD: 4.21 M/UL
RBC # FLD: 13.7 %
RENIN ACTIVITY, PLASMA: 0.39 NG/ML/HR
SODIUM SERPL-SCNC: 142 MMOL/L
TRIGL SERPL-MCNC: 103 MG/DL
VIT B12 SERPL-MCNC: >2000 PG/ML
WBC # FLD AUTO: 6.82 K/UL

## 2024-05-03 RX ORDER — INSULIN LISPRO 100 [IU]/ML
100 INJECTION, SOLUTION INTRAVENOUS; SUBCUTANEOUS
Qty: 5 | Refills: 1 | Status: ACTIVE | COMMUNITY
Start: 2024-02-27 | End: 1900-01-01

## 2024-05-04 ENCOUNTER — RX RENEWAL (OUTPATIENT)
Age: 69
End: 2024-05-04

## 2024-05-10 ENCOUNTER — RX RENEWAL (OUTPATIENT)
Age: 69
End: 2024-05-10

## 2024-05-10 RX ORDER — INSULIN LISPRO 100 [IU]/ML
100 INJECTION, SOLUTION INTRAVENOUS; SUBCUTANEOUS
Qty: 15 | Refills: 1 | Status: ACTIVE | COMMUNITY
Start: 2024-05-04 | End: 1900-01-01

## 2024-05-10 RX ORDER — PEN NEEDLE, DIABETIC 32GX 5/32"
32G X 4 MM NEEDLE, DISPOSABLE MISCELLANEOUS
Qty: 4 | Refills: 3 | Status: ACTIVE | COMMUNITY
Start: 2023-06-08 | End: 1900-01-01

## 2024-05-16 ENCOUNTER — RX RENEWAL (OUTPATIENT)
Age: 69
End: 2024-05-16

## 2024-05-16 RX ORDER — METFORMIN HYDROCHLORIDE 1000 MG/1
1000 TABLET, COATED ORAL
Qty: 180 | Refills: 1 | Status: ACTIVE | COMMUNITY
Start: 2017-03-15 | End: 1900-01-01

## 2024-06-03 ENCOUNTER — APPOINTMENT (OUTPATIENT)
Dept: ENDOCRINOLOGY | Facility: CLINIC | Age: 69
End: 2024-06-03

## 2024-06-25 ENCOUNTER — APPOINTMENT (OUTPATIENT)
Dept: VASCULAR SURGERY | Facility: CLINIC | Age: 69
End: 2024-06-25

## 2024-08-06 ENCOUNTER — APPOINTMENT (OUTPATIENT)
Dept: ENDOCRINOLOGY | Facility: CLINIC | Age: 69
End: 2024-08-06

## 2024-08-06 PROCEDURE — 95251 CONT GLUC MNTR ANALYSIS I&R: CPT

## 2024-08-06 PROCEDURE — 99214 OFFICE O/P EST MOD 30 MIN: CPT

## 2024-08-06 NOTE — HISTORY OF PRESENT ILLNESS
[Continuous Glucose Monitoring] : Continuous Glucose Monitoring: Yes [FreeTextEntry1] : 69-year-old man with history of type 2 diabetes, hypertension, hyperlipidemia, here for endocrine follow-up for type 2 diabetes  Diagnosed with type 2 diabetes more than 30 years ago. No history of diabetic retinopathy, neuropathy or nephropathy.  Regarding hyperlipidemia on atorvastatin 40 mg once daily  Regarding hypertension, patient is currently not taking Jardiance secondary to the cost of the medication.  Also not taking GLP1RA due to the cost of the medication.   Patient is currently receiving treatment with IVIG with CIDP with neurologist.  He receives steroid injection during the treatment.  He has been monitoring glucose level with Dexcom G7 which he finds to be very helpful.   [Hypoglycemia] : Patient is not hypoglycemic. [FreeTextEntry2] : 80 [FreeTextEntry3] : 19 [FreeTextEntry4] : 1 [FreeTextEntry7] : 46 [de-identified] : NA

## 2024-08-06 NOTE — THERAPY
[TextEntry] : Tresiba FlexTouch 100 UNIT/ML 22-25 units at bedtime   HumaLOG KwikPen 100 UNIT/ML 10-15 units tid with meals

## 2024-08-06 NOTE — ASSESSMENT
[FreeTextEntry1] : Patient is a 68-year-old man with history of type 2 diabetes, hyperlipidemia, hypertension, vitamin D deficiency, here for endocrinology follow-up.  1.  Type 2 diabetes August 2024: A1c 7.1% February 2024: A1c 9.9% October 2023: A1c 11.9% July 2023: A1c 12.3% March 2023 A1c 11.6% December 2022: A1c 11.5% In the past was on basal insulin with repaglinide, SGLT2 inhibitor as well as Trulicity. But the medication is costing too much money and not bringing glucose down. Patient was switched to basal bolus regimen last visit along with a CGM monitoring. He finds CGM to be very helpful.  DM PLAN: Tresiba FlexTouch 100 UNIT/ML 22-25 units at bedtime   HumaLOG KwikPen 100 UNIT/ML 10-15 units tid with meals   Discussed carb consistent diet Exercise: Discussed the regular exercises are beneficial in type 2 diabetes, independent of weight loss.  Encouraged to decrease sedentary time and perform 30 to 60 minutes of moderate intensity aerobic exercise on most days of the week, at least 150 minutes of moderate intensity aerobic exercise per week. Recommend to check glucose fasting, and 2 hours postprandially on certain meals to see any of the fluid content is spiking his glucose to above 180 mg/dL. Ophthalmology: Up-to-date Podiatry: Up-to-date follows up with Dr. Levin  2.  Hyperlipidemia LDL goal <70 mg/dL Continue with statin therapy, currently on Lipitor 40 mg once daily.  3.  Vitamin D deficiency Normal vitamin D level normal in March 2021 Continue vitamin D 2000 IU daily.  Follow-up in 3 months with NP.  Patient states that he is going to start treatment for CIDP with his neurologist Dr. Darion Vieira.  We will need to contact the office to find out about his treatment regimen.  If they are glucocorticoid involved, recommend adjustment of his insulin therapy.  I will have the RN reach out to Dr. Vieira's office.  Chronic Inflammatory Demyelinating Polyneuropathy (CIDP) treatment started  [phone] Dr. Darion Vieira MD

## 2024-08-27 ENCOUNTER — RX RENEWAL (OUTPATIENT)
Age: 69
End: 2024-08-27

## 2024-09-19 ENCOUNTER — RX RENEWAL (OUTPATIENT)
Age: 69
End: 2024-09-19

## 2024-10-31 ENCOUNTER — RX RENEWAL (OUTPATIENT)
Age: 69
End: 2024-10-31

## 2024-11-12 ENCOUNTER — NON-APPOINTMENT (OUTPATIENT)
Age: 69
End: 2024-11-12

## 2024-11-12 ENCOUNTER — APPOINTMENT (OUTPATIENT)
Dept: ENDOCRINOLOGY | Facility: CLINIC | Age: 69
End: 2024-11-12
Payer: MEDICARE

## 2024-11-12 VITALS
DIASTOLIC BLOOD PRESSURE: 70 MMHG | HEART RATE: 96 BPM | HEIGHT: 72 IN | WEIGHT: 218 LBS | BODY MASS INDEX: 29.53 KG/M2 | SYSTOLIC BLOOD PRESSURE: 130 MMHG | OXYGEN SATURATION: 99 %

## 2024-11-12 DIAGNOSIS — E55.9 VITAMIN D DEFICIENCY, UNSPECIFIED: ICD-10-CM

## 2024-11-12 DIAGNOSIS — E11.42 TYPE 2 DIABETES MELLITUS WITH DIABETIC POLYNEUROPATHY: ICD-10-CM

## 2024-11-12 DIAGNOSIS — E11.9 TYPE 2 DIABETES MELLITUS W/OUT COMPLICATIONS: ICD-10-CM

## 2024-11-12 DIAGNOSIS — E78.5 HYPERLIPIDEMIA, UNSPECIFIED: ICD-10-CM

## 2024-11-12 LAB
GLUCOSE BLDC GLUCOMTR-MCNC: 130
HBA1C MFR BLD HPLC: 6.5

## 2024-11-12 PROCEDURE — G2211 COMPLEX E/M VISIT ADD ON: CPT

## 2024-11-12 PROCEDURE — 83036 HEMOGLOBIN GLYCOSYLATED A1C: CPT | Mod: QW

## 2024-11-12 PROCEDURE — 82962 GLUCOSE BLOOD TEST: CPT

## 2024-11-12 PROCEDURE — 99214 OFFICE O/P EST MOD 30 MIN: CPT

## 2025-01-22 ENCOUNTER — RX RENEWAL (OUTPATIENT)
Age: 70
End: 2025-01-22

## 2025-02-19 ENCOUNTER — RX RENEWAL (OUTPATIENT)
Age: 70
End: 2025-02-19

## 2025-02-24 ENCOUNTER — APPOINTMENT (OUTPATIENT)
Dept: ENDOCRINOLOGY | Facility: CLINIC | Age: 70
End: 2025-02-24
Payer: MEDICARE

## 2025-02-24 VITALS
BODY MASS INDEX: 30.88 KG/M2 | SYSTOLIC BLOOD PRESSURE: 134 MMHG | OXYGEN SATURATION: 96 % | WEIGHT: 228 LBS | HEIGHT: 72 IN | HEART RATE: 87 BPM | DIASTOLIC BLOOD PRESSURE: 66 MMHG

## 2025-02-24 DIAGNOSIS — E55.9 VITAMIN D DEFICIENCY, UNSPECIFIED: ICD-10-CM

## 2025-02-24 DIAGNOSIS — E11.42 TYPE 2 DIABETES MELLITUS WITH DIABETIC POLYNEUROPATHY: ICD-10-CM

## 2025-02-24 DIAGNOSIS — E78.5 HYPERLIPIDEMIA, UNSPECIFIED: ICD-10-CM

## 2025-02-24 PROCEDURE — G2211 COMPLEX E/M VISIT ADD ON: CPT

## 2025-02-24 PROCEDURE — 83036 HEMOGLOBIN GLYCOSYLATED A1C: CPT | Mod: QW

## 2025-02-24 PROCEDURE — 99214 OFFICE O/P EST MOD 30 MIN: CPT

## 2025-02-27 LAB
ALBUMIN SERPL ELPH-MCNC: 5 G/DL
ALDOSTERONE SERUM: 5.4 NG/DL
ALP BLD-CCNC: 110 U/L
ALT SERPL-CCNC: 30 U/L
ANION GAP SERPL CALC-SCNC: 11 MMOL/L
AST SERPL-CCNC: 29 U/L
BILIRUB SERPL-MCNC: 0.3 MG/DL
BUN SERPL-MCNC: 30 MG/DL
CALCIUM SERPL-MCNC: 10 MG/DL
CHLORIDE SERPL-SCNC: 105 MMOL/L
CHOLEST SERPL-MCNC: 136 MG/DL
CO2 SERPL-SCNC: 26 MMOL/L
CREAT SERPL-MCNC: 0.98 MG/DL
CREAT SPEC-SCNC: 84 MG/DL
EGFR: 83 ML/MIN/1.73M2
ESTIMATED AVERAGE GLUCOSE: 160 MG/DL
GLUCOSE SERPL-MCNC: 50 MG/DL
HBA1C MFR BLD HPLC: 6.9
HBA1C MFR BLD HPLC: 7.2 %
HDLC SERPL-MCNC: 48 MG/DL
LDLC SERPL CALC-MCNC: 53 MG/DL
MICROALBUMIN 24H UR DL<=1MG/L-MCNC: 4.1 MG/DL
MICROALBUMIN/CREAT 24H UR-RTO: 49 MG/G
NONHDLC SERPL-MCNC: 88 MG/DL
POTASSIUM SERPL-SCNC: 4.7 MMOL/L
PROT SERPL-MCNC: 6.7 G/DL
SODIUM SERPL-SCNC: 142 MMOL/L
TRIGL SERPL-MCNC: 216 MG/DL

## 2025-03-02 LAB
METANEPHRINE, PL: 33.4 PG/ML
NORMETANEPHRINE, PL: 245.1 PG/ML
RENIN ACTIVITY, PLASMA: 9.33 NG/ML/HR

## 2025-03-12 ENCOUNTER — RX RENEWAL (OUTPATIENT)
Age: 70
End: 2025-03-12

## 2025-04-23 ENCOUNTER — RX RENEWAL (OUTPATIENT)
Age: 70
End: 2025-04-23

## 2025-05-27 ENCOUNTER — APPOINTMENT (OUTPATIENT)
Dept: ENDOCRINOLOGY | Facility: CLINIC | Age: 70
End: 2025-05-27

## 2025-06-24 ENCOUNTER — APPOINTMENT (OUTPATIENT)
Dept: ENDOCRINOLOGY | Facility: CLINIC | Age: 70
End: 2025-06-24
Payer: MEDICARE

## 2025-06-24 VITALS
OXYGEN SATURATION: 97 % | BODY MASS INDEX: 30.34 KG/M2 | HEIGHT: 72 IN | WEIGHT: 224 LBS | DIASTOLIC BLOOD PRESSURE: 76 MMHG | HEART RATE: 82 BPM | SYSTOLIC BLOOD PRESSURE: 134 MMHG

## 2025-06-24 LAB
GLUCOSE BLDC GLUCOMTR-MCNC: 101
HBA1C MFR BLD HPLC: 6.6

## 2025-06-24 PROCEDURE — 99214 OFFICE O/P EST MOD 30 MIN: CPT

## 2025-06-24 PROCEDURE — 83036 HEMOGLOBIN GLYCOSYLATED A1C: CPT | Mod: QW

## 2025-06-24 PROCEDURE — 82962 GLUCOSE BLOOD TEST: CPT

## 2025-06-24 PROCEDURE — G2211 COMPLEX E/M VISIT ADD ON: CPT

## 2025-07-22 ENCOUNTER — RX RENEWAL (OUTPATIENT)
Age: 70
End: 2025-07-22